# Patient Record
Sex: FEMALE | Race: ASIAN | NOT HISPANIC OR LATINO
[De-identification: names, ages, dates, MRNs, and addresses within clinical notes are randomized per-mention and may not be internally consistent; named-entity substitution may affect disease eponyms.]

---

## 2018-01-22 ENCOUNTER — RESULT REVIEW (OUTPATIENT)
Age: 28
End: 2018-01-22

## 2018-03-27 ENCOUNTER — RESULT REVIEW (OUTPATIENT)
Age: 28
End: 2018-03-27

## 2018-05-23 ENCOUNTER — INPATIENT (INPATIENT)
Facility: HOSPITAL | Age: 28
LOS: 2 days | Discharge: HOME | DRG: 759 | End: 2018-05-26
Attending: OBSTETRICS & GYNECOLOGY | Admitting: OBSTETRICS & GYNECOLOGY
Payer: COMMERCIAL

## 2018-05-23 VITALS
RESPIRATION RATE: 18 BRPM | TEMPERATURE: 100 F | WEIGHT: 160.06 LBS | HEART RATE: 146 BPM | SYSTOLIC BLOOD PRESSURE: 127 MMHG | OXYGEN SATURATION: 100 % | DIASTOLIC BLOOD PRESSURE: 81 MMHG

## 2018-05-23 LAB
ALBUMIN SERPL ELPH-MCNC: 4.6 G/DL — SIGNIFICANT CHANGE UP (ref 3.3–5)
ALP SERPL-CCNC: 88 U/L — SIGNIFICANT CHANGE UP (ref 40–120)
ALT FLD-CCNC: 30 U/L — SIGNIFICANT CHANGE UP (ref 10–45)
ANION GAP SERPL CALC-SCNC: 15 MMOL/L — SIGNIFICANT CHANGE UP (ref 5–17)
APPEARANCE UR: CLEAR — SIGNIFICANT CHANGE UP
APTT BLD: 31.8 SEC — SIGNIFICANT CHANGE UP (ref 27.5–37.4)
AST SERPL-CCNC: 14 U/L — SIGNIFICANT CHANGE UP (ref 10–40)
BASE EXCESS BLDV CALC-SCNC: 2.3 MMOL/L — HIGH (ref -2–2)
BASOPHILS # BLD AUTO: 0.1 K/UL — SIGNIFICANT CHANGE UP (ref 0–0.2)
BASOPHILS NFR BLD AUTO: 0.5 % — SIGNIFICANT CHANGE UP (ref 0–2)
BILIRUB SERPL-MCNC: 0.4 MG/DL — SIGNIFICANT CHANGE UP (ref 0.2–1.2)
BILIRUB UR-MCNC: NEGATIVE — SIGNIFICANT CHANGE UP
BUN SERPL-MCNC: 8 MG/DL — SIGNIFICANT CHANGE UP (ref 7–23)
CA-I SERPL-SCNC: 1.11 MMOL/L — LOW (ref 1.12–1.3)
CALCIUM SERPL-MCNC: 9.3 MG/DL — SIGNIFICANT CHANGE UP (ref 8.4–10.5)
CHLORIDE BLDV-SCNC: 108 MMOL/L — SIGNIFICANT CHANGE UP (ref 96–108)
CHLORIDE SERPL-SCNC: 97 MMOL/L — SIGNIFICANT CHANGE UP (ref 96–108)
CO2 BLDV-SCNC: 28 MMOL/L — SIGNIFICANT CHANGE UP (ref 22–30)
CO2 SERPL-SCNC: 25 MMOL/L — SIGNIFICANT CHANGE UP (ref 22–31)
COLOR SPEC: SIGNIFICANT CHANGE UP
CREAT SERPL-MCNC: 0.68 MG/DL — SIGNIFICANT CHANGE UP (ref 0.5–1.3)
DIFF PNL FLD: NEGATIVE — SIGNIFICANT CHANGE UP
EOSINOPHIL # BLD AUTO: 0 K/UL — SIGNIFICANT CHANGE UP (ref 0–0.5)
EOSINOPHIL NFR BLD AUTO: 0.3 % — SIGNIFICANT CHANGE UP (ref 0–6)
EPI CELLS # UR: SIGNIFICANT CHANGE UP /HPF
GAS PNL BLDV: 133 MMOL/L — LOW (ref 136–145)
GAS PNL BLDV: SIGNIFICANT CHANGE UP
GAS PNL BLDV: SIGNIFICANT CHANGE UP
GLUCOSE BLDV-MCNC: 153 MG/DL — HIGH (ref 70–99)
GLUCOSE SERPL-MCNC: 140 MG/DL — HIGH (ref 70–99)
GLUCOSE UR QL: >1000 MG/DL
HCO3 BLDV-SCNC: 27 MMOL/L — SIGNIFICANT CHANGE UP (ref 21–29)
HCT VFR BLD CALC: 41.2 % — SIGNIFICANT CHANGE UP (ref 34.5–45)
HCT VFR BLDA CALC: 42 % — SIGNIFICANT CHANGE UP (ref 39–50)
HGB BLD CALC-MCNC: 13.5 G/DL — SIGNIFICANT CHANGE UP (ref 11.5–15.5)
HGB BLD-MCNC: 13.8 G/DL — SIGNIFICANT CHANGE UP (ref 11.5–15.5)
INR BLD: 1.13 RATIO — SIGNIFICANT CHANGE UP (ref 0.88–1.16)
KETONES UR-MCNC: ABNORMAL
LACTATE BLDV-MCNC: 1.9 MMOL/L — SIGNIFICANT CHANGE UP (ref 0.7–2)
LEUKOCYTE ESTERASE UR-ACNC: NEGATIVE — SIGNIFICANT CHANGE UP
LYMPHOCYTES # BLD AUTO: 2.9 K/UL — SIGNIFICANT CHANGE UP (ref 1–3.3)
LYMPHOCYTES # BLD AUTO: 21.2 % — SIGNIFICANT CHANGE UP (ref 13–44)
MCHC RBC-ENTMCNC: 27.6 PG — SIGNIFICANT CHANGE UP (ref 27–34)
MCHC RBC-ENTMCNC: 33.4 GM/DL — SIGNIFICANT CHANGE UP (ref 32–36)
MCV RBC AUTO: 82.6 FL — SIGNIFICANT CHANGE UP (ref 80–100)
MONOCYTES # BLD AUTO: 0.5 K/UL — SIGNIFICANT CHANGE UP (ref 0–0.9)
MONOCYTES NFR BLD AUTO: 3.4 % — SIGNIFICANT CHANGE UP (ref 2–14)
NEUTROPHILS # BLD AUTO: 10.1 K/UL — HIGH (ref 1.8–7.4)
NEUTROPHILS NFR BLD AUTO: 74.6 % — SIGNIFICANT CHANGE UP (ref 43–77)
NITRITE UR-MCNC: NEGATIVE — SIGNIFICANT CHANGE UP
PCO2 BLDV: 44 MMHG — SIGNIFICANT CHANGE UP (ref 35–50)
PH BLDV: 7.4 — SIGNIFICANT CHANGE UP (ref 7.35–7.45)
PH UR: 6.5 — SIGNIFICANT CHANGE UP (ref 5–8)
PLATELET # BLD AUTO: 331 K/UL — SIGNIFICANT CHANGE UP (ref 150–400)
PO2 BLDV: 32 MMHG — SIGNIFICANT CHANGE UP (ref 25–45)
POTASSIUM BLDV-SCNC: 3.7 MMOL/L — SIGNIFICANT CHANGE UP (ref 3.5–5)
POTASSIUM SERPL-MCNC: 3.8 MMOL/L — SIGNIFICANT CHANGE UP (ref 3.5–5.3)
POTASSIUM SERPL-SCNC: 3.8 MMOL/L — SIGNIFICANT CHANGE UP (ref 3.5–5.3)
PROT SERPL-MCNC: 8 G/DL — SIGNIFICANT CHANGE UP (ref 6–8.3)
PROT UR-MCNC: NEGATIVE — SIGNIFICANT CHANGE UP
PROTHROM AB SERPL-ACNC: 12.2 SEC — SIGNIFICANT CHANGE UP (ref 9.8–12.7)
RBC # BLD: 4.99 M/UL — SIGNIFICANT CHANGE UP (ref 3.8–5.2)
RBC # FLD: 13 % — SIGNIFICANT CHANGE UP (ref 10.3–14.5)
RBC CASTS # UR COMP ASSIST: SIGNIFICANT CHANGE UP /HPF (ref 0–2)
SAO2 % BLDV: 57 % — LOW (ref 67–88)
SODIUM SERPL-SCNC: 137 MMOL/L — SIGNIFICANT CHANGE UP (ref 135–145)
SP GR SPEC: 1.02 — SIGNIFICANT CHANGE UP (ref 1.01–1.02)
UROBILINOGEN FLD QL: NEGATIVE — SIGNIFICANT CHANGE UP
WBC # BLD: 13.5 K/UL — HIGH (ref 3.8–10.5)
WBC # FLD AUTO: 13.5 K/UL — HIGH (ref 3.8–10.5)
WBC UR QL: SIGNIFICANT CHANGE UP /HPF (ref 0–5)

## 2018-05-23 PROCEDURE — 74177 CT ABD & PELVIS W/CONTRAST: CPT | Mod: 26

## 2018-05-23 PROCEDURE — 99285 EMERGENCY DEPT VISIT HI MDM: CPT

## 2018-05-23 RX ORDER — ACETAMINOPHEN 500 MG
1000 TABLET ORAL ONCE
Qty: 0 | Refills: 0 | Status: COMPLETED | OUTPATIENT
Start: 2018-05-23 | End: 2018-05-23

## 2018-05-23 RX ORDER — PIPERACILLIN AND TAZOBACTAM 4; .5 G/20ML; G/20ML
3.38 INJECTION, POWDER, LYOPHILIZED, FOR SOLUTION INTRAVENOUS ONCE
Qty: 0 | Refills: 0 | Status: COMPLETED | OUTPATIENT
Start: 2018-05-23 | End: 2018-05-23

## 2018-05-23 RX ORDER — SODIUM CHLORIDE 9 MG/ML
2000 INJECTION INTRAMUSCULAR; INTRAVENOUS; SUBCUTANEOUS ONCE
Qty: 0 | Refills: 0 | Status: COMPLETED | OUTPATIENT
Start: 2018-05-23 | End: 2018-05-23

## 2018-05-23 RX ADMIN — Medication 400 MILLIGRAM(S): at 21:18

## 2018-05-23 RX ADMIN — PIPERACILLIN AND TAZOBACTAM 200 GRAM(S): 4; .5 INJECTION, POWDER, LYOPHILIZED, FOR SOLUTION INTRAVENOUS at 22:03

## 2018-05-23 RX ADMIN — SODIUM CHLORIDE 2000 MILLILITER(S): 9 INJECTION INTRAMUSCULAR; INTRAVENOUS; SUBCUTANEOUS at 20:47

## 2018-05-23 NOTE — ED PROVIDER NOTE - OBJECTIVE STATEMENT
26 y/o female no p mhx who presents to the ED for abdominal pain. reports 2 days of sharp stabbing intermittent pain. no n/v/d. no fever/chills. no vaginal discharge/dysuria. no surgical hx. lmp 2 weeks ago - denies chance of pregnancy.

## 2018-05-23 NOTE — ED ADULT NURSE NOTE - CHPI ED SYMPTOMS NEG
no vomiting/no hematuria/no dysuria/no blood in stool/no nausea/no burning urination/no chills/no abdominal distension/no fever

## 2018-05-23 NOTE — ED PROVIDER NOTE - ATTENDING CONTRIBUTION TO CARE
26 yo F presents with abdominal pain since yesterday. generalized nonspecific pain that is more in the lower abdomen at this time. constant 6/10 discomfort. no associated symptoms.   PE with generalized abdominal TTP, but most tender RLQ with localized peritonitis. no adnexal TTP  patient started on IVFs and given zosyn empirically in the ER. 26 yo F presents with abdominal pain since yesterday. generalized nonspecific pain that is more in the lower abdomen at this time. constant 6/10 discomfort. no associated symptoms.  Denies fevers/chills, urinary complaints, vaginal discharge or bleeding. No known STD risk factors  Or history.  PE with generalized abdominal TTP, but most tender RLQ with localized peritonitis. no significant adnexal TTP. no CMT, no discharge  patient started on IVFs and given zosyn empirically in the ER.  ed wokrup showed  CT a/p showing Enlarged bilateral adnexal cystic structures the right larger than left, 	may represent cysts, however some areas demonstrate mildly tubular 	configuration for which hydrosalpinx cannot be excluded. Normal appendix.   Leukocytosis of 13.5.  Lactate normal.  Ultrasound pelvis ordered. OB/GYN consulted, who is will come evaluate patient in the ER.   Patient signed out to Dr. Gutierrez at this time. Ultrasound results as well as consult and final disposition pending at time of Sino. 26 yo F presents with abdominal pain since yesterday. generalized nonspecific pain that is more in the lower abdomen at this time. constant 6/10 discomfort. no associated symptoms.  Denies fevers/chills, urinary complaints, vaginal discharge or bleeding. No known STD risk factors  Or history.  PE with generalized abdominal TTP, but most tender RLQ with localized peritonitis. no significant adnexal TTP. no CMT, no discharge  patient started on IVFs and given zosyn empirically in the ER.  ed wokrup showed  CT a/p showing Enlarged bilateral adnexal cystic structures the right larger than left, 	may represent cysts, however some areas demonstrate mildly tubular 	configuration for which hydrosalpinx cannot be excluded. Normal appendix.   Leukocytosis of 13.5.  Lactate normal.  Ultrasound pelvis ordered. OB/GYN consulted, who is will come evaluate patient in the ER.   Patient signed out to Dr. Gutierrez at this time. Ultrasound results as well as consult and final disposition pending at time of Sign out.

## 2018-05-23 NOTE — ED ADULT NURSE NOTE - OBJECTIVE STATEMENT
27 year old female came into the ER via walk in with c/o generalized abdominal pain. Pt states the pain was sudden onset this evening as she started eating dinner. Pain is generalized, but worse in the RLQ and tender. Abd is soft, non distended, and pt states her BMs have been more liquid and less stool than normal x 1 day. Pt has no c/o back pain, CP, SOB, N/V/D, fever, chills, HA, dizziness at this time. Family at bedside. Comfort and safety maintained.

## 2018-05-24 ENCOUNTER — TRANSCRIPTION ENCOUNTER (OUTPATIENT)
Age: 28
End: 2018-05-24

## 2018-05-24 DIAGNOSIS — N70.11 CHRONIC SALPINGITIS: ICD-10-CM

## 2018-05-24 LAB
BASOPHILS # BLD AUTO: 0 K/UL — SIGNIFICANT CHANGE UP (ref 0–0.2)
BASOPHILS NFR BLD AUTO: 0.5 % — SIGNIFICANT CHANGE UP (ref 0–2)
C TRACH RRNA SPEC QL NAA+PROBE: SIGNIFICANT CHANGE UP
CULTURE RESULTS: SIGNIFICANT CHANGE UP
EOSINOPHIL # BLD AUTO: 0.1 K/UL — SIGNIFICANT CHANGE UP (ref 0–0.5)
EOSINOPHIL NFR BLD AUTO: 0.9 % — SIGNIFICANT CHANGE UP (ref 0–6)
GLUCOSE BLDC GLUCOMTR-MCNC: 145 MG/DL — HIGH (ref 70–99)
GLUCOSE BLDC GLUCOMTR-MCNC: 148 MG/DL — HIGH (ref 70–99)
GLUCOSE BLDC GLUCOMTR-MCNC: 187 MG/DL — HIGH (ref 70–99)
GLUCOSE BLDC GLUCOMTR-MCNC: 208 MG/DL — HIGH (ref 70–99)
HCT VFR BLD CALC: 31.5 % — LOW (ref 34.5–45)
HGB BLD-MCNC: 10.5 G/DL — LOW (ref 11.5–15.5)
HIV 1+2 AB+HIV1 P24 AG SERPL QL IA: SIGNIFICANT CHANGE UP
LYMPHOCYTES # BLD AUTO: 1.4 K/UL — SIGNIFICANT CHANGE UP (ref 1–3.3)
LYMPHOCYTES # BLD AUTO: 19.3 % — SIGNIFICANT CHANGE UP (ref 13–44)
MCHC RBC-ENTMCNC: 27.9 PG — SIGNIFICANT CHANGE UP (ref 27–34)
MCHC RBC-ENTMCNC: 33.3 GM/DL — SIGNIFICANT CHANGE UP (ref 32–36)
MCV RBC AUTO: 83.7 FL — SIGNIFICANT CHANGE UP (ref 80–100)
MONOCYTES # BLD AUTO: 0.3 K/UL — SIGNIFICANT CHANGE UP (ref 0–0.9)
MONOCYTES NFR BLD AUTO: 4.4 % — SIGNIFICANT CHANGE UP (ref 2–14)
N GONORRHOEA RRNA SPEC QL NAA+PROBE: SIGNIFICANT CHANGE UP
NEUTROPHILS # BLD AUTO: 5.3 K/UL — SIGNIFICANT CHANGE UP (ref 1.8–7.4)
NEUTROPHILS NFR BLD AUTO: 74.9 % — SIGNIFICANT CHANGE UP (ref 43–77)
PLATELET # BLD AUTO: 228 K/UL — SIGNIFICANT CHANGE UP (ref 150–400)
RBC # BLD: 3.76 M/UL — LOW (ref 3.8–5.2)
RBC # FLD: 13 % — SIGNIFICANT CHANGE UP (ref 10.3–14.5)
SPECIMEN SOURCE: SIGNIFICANT CHANGE UP
SPECIMEN SOURCE: SIGNIFICANT CHANGE UP
T PALLIDUM AB TITR SER: NEGATIVE — SIGNIFICANT CHANGE UP
WBC # BLD: 7.1 K/UL — SIGNIFICANT CHANGE UP (ref 3.8–10.5)
WBC # FLD AUTO: 7.1 K/UL — SIGNIFICANT CHANGE UP (ref 3.8–10.5)

## 2018-05-24 PROCEDURE — 93976 VASCULAR STUDY: CPT | Mod: 26,59

## 2018-05-24 PROCEDURE — 76856 US EXAM PELVIC COMPLETE: CPT | Mod: 26,59

## 2018-05-24 PROCEDURE — 76830 TRANSVAGINAL US NON-OB: CPT | Mod: 26

## 2018-05-24 RX ORDER — ACETAMINOPHEN 500 MG
975 TABLET ORAL EVERY 6 HOURS
Qty: 0 | Refills: 0 | Status: DISCONTINUED | OUTPATIENT
Start: 2018-05-24 | End: 2018-05-26

## 2018-05-24 RX ORDER — SODIUM CHLORIDE 9 MG/ML
1000 INJECTION, SOLUTION INTRAVENOUS
Qty: 0 | Refills: 0 | Status: DISCONTINUED | OUTPATIENT
Start: 2018-05-24 | End: 2018-05-25

## 2018-05-24 RX ORDER — CEFOTETAN DISODIUM 1 G
VIAL (EA) INJECTION
Qty: 0 | Refills: 0 | Status: DISCONTINUED | OUTPATIENT
Start: 2018-05-24 | End: 2018-05-26

## 2018-05-24 RX ORDER — HEPARIN SODIUM 5000 [USP'U]/ML
5000 INJECTION INTRAVENOUS; SUBCUTANEOUS EVERY 12 HOURS
Qty: 0 | Refills: 0 | Status: DISCONTINUED | OUTPATIENT
Start: 2018-05-24 | End: 2018-05-26

## 2018-05-24 RX ORDER — DEXTROSE 50 % IN WATER 50 %
12.5 SYRINGE (ML) INTRAVENOUS ONCE
Qty: 0 | Refills: 0 | Status: DISCONTINUED | OUTPATIENT
Start: 2018-05-24 | End: 2018-05-26

## 2018-05-24 RX ORDER — GLUCAGON INJECTION, SOLUTION 0.5 MG/.1ML
1 INJECTION, SOLUTION SUBCUTANEOUS ONCE
Qty: 0 | Refills: 0 | Status: DISCONTINUED | OUTPATIENT
Start: 2018-05-24 | End: 2018-05-26

## 2018-05-24 RX ORDER — INSULIN LISPRO 100/ML
VIAL (ML) SUBCUTANEOUS AT BEDTIME
Qty: 0 | Refills: 0 | Status: DISCONTINUED | OUTPATIENT
Start: 2018-05-24 | End: 2018-05-26

## 2018-05-24 RX ORDER — CEFOTETAN DISODIUM 1 G
2 VIAL (EA) INJECTION EVERY 12 HOURS
Qty: 0 | Refills: 0 | Status: DISCONTINUED | OUTPATIENT
Start: 2018-05-24 | End: 2018-05-26

## 2018-05-24 RX ORDER — DEXTROSE 50 % IN WATER 50 %
25 SYRINGE (ML) INTRAVENOUS ONCE
Qty: 0 | Refills: 0 | Status: DISCONTINUED | OUTPATIENT
Start: 2018-05-24 | End: 2018-05-26

## 2018-05-24 RX ORDER — METRONIDAZOLE 500 MG
TABLET ORAL
Qty: 0 | Refills: 0 | Status: DISCONTINUED | OUTPATIENT
Start: 2018-05-24 | End: 2018-05-26

## 2018-05-24 RX ORDER — METRONIDAZOLE 500 MG
500 TABLET ORAL ONCE
Qty: 0 | Refills: 0 | Status: COMPLETED | OUTPATIENT
Start: 2018-05-24 | End: 2018-05-24

## 2018-05-24 RX ORDER — DEXTROSE 50 % IN WATER 50 %
15 SYRINGE (ML) INTRAVENOUS ONCE
Qty: 0 | Refills: 0 | Status: DISCONTINUED | OUTPATIENT
Start: 2018-05-24 | End: 2018-05-26

## 2018-05-24 RX ORDER — INSULIN LISPRO 100/ML
VIAL (ML) SUBCUTANEOUS
Qty: 0 | Refills: 0 | Status: DISCONTINUED | OUTPATIENT
Start: 2018-05-24 | End: 2018-05-26

## 2018-05-24 RX ORDER — METRONIDAZOLE 500 MG
500 TABLET ORAL EVERY 8 HOURS
Qty: 0 | Refills: 0 | Status: DISCONTINUED | OUTPATIENT
Start: 2018-05-24 | End: 2018-05-26

## 2018-05-24 RX ORDER — CEFOTETAN DISODIUM 1 G
2 VIAL (EA) INJECTION ONCE
Qty: 0 | Refills: 0 | Status: COMPLETED | OUTPATIENT
Start: 2018-05-24 | End: 2018-05-24

## 2018-05-24 RX ADMIN — Medication 100 MILLIGRAM(S): at 12:15

## 2018-05-24 RX ADMIN — Medication 975 MILLIGRAM(S): at 20:17

## 2018-05-24 RX ADMIN — Medication 100 GRAM(S): at 05:02

## 2018-05-24 RX ADMIN — Medication 110 MILLIGRAM(S): at 06:19

## 2018-05-24 RX ADMIN — Medication 110 MILLIGRAM(S): at 19:47

## 2018-05-24 RX ADMIN — Medication 975 MILLIGRAM(S): at 19:47

## 2018-05-24 RX ADMIN — Medication 100 GRAM(S): at 19:05

## 2018-05-24 RX ADMIN — Medication 4: at 20:03

## 2018-05-24 RX ADMIN — SODIUM CHLORIDE 125 MILLILITER(S): 9 INJECTION, SOLUTION INTRAVENOUS at 12:15

## 2018-05-24 RX ADMIN — Medication 100 MILLIGRAM(S): at 22:59

## 2018-05-24 RX ADMIN — Medication 100 MILLIGRAM(S): at 03:51

## 2018-05-24 NOTE — DISCHARGE NOTE ADULT - HOSPITAL COURSE
26 yo F with a PMH of T2DM, initially presented with 2 days of abdominal pain and leukocytosis, admitted with suspected b/l TOA on 5/24/18. Imaging CTAP and TVUS showed b/l complex cystic structures (R>L) in the adnexa, largest being 4.5x2.9x2.9cm. Pt was hemodynamically stable and afebrile throughout her hospital course.   HD#1: Pt started on IV abx (cefotetan, doxycycline, flagyl). PO pain medication. ISS for DM.  Cultures and labs obtained. Pt remained afebrile.  HD#2: Leukocytosis resolved and pain well controlled. Bcx preliminarily showed NGTD and all other labs negative  HD#3: Pt for repeat TVUS that revealed ***  HD#**: Pt discharge home pt was discharged in stable condition, ambulating, tolerating po and voiding spontaneously. Pt to have close f/u w/ Dr. Fleming in clinic. 28 yo F with a PMH of T2DM, initially presented with 2 days of abdominal pain and leukocytosis, admitted with suspected b/l TOA on 5/24/18. Imaging CTAP and TVUS showed b/l complex cystic structures (R>L) in the adnexa, largest being 4.5x2.9x2.9cm. Pt was hemodynamically stable and afebrile throughout her hospital course.   HD#1: Pt started on IV abx (cefotetan, doxycycline, flagyl). PO pain medication. ISS for DM.  Cultures and labs obtained. Pt remained afebrile.  HD#2: Leukocytosis resolved and pain well controlled. Bcx preliminarily showed NGTD and all other labs negative  HD#3: Pt for repeat TVUS that revealed stable findings, no worsening.  Pt discharged home on hd#3 in stable condition, ambulating, tolerating po and voiding spontaneously. Pt to have close f/u w/ Dr. Fleming in clinic.

## 2018-05-24 NOTE — H&P ADULT - NSHPPHYSICALEXAM_GEN_ALL_CORE
Constitutional: alert and oriented x 3  Respiratory: clear    Cardiovascular: regular rate and rhythm    Gastrointestinal: soft, non tender, + bowel sounds. No hepatosplenomegaly, no palpable masses. No rebound. No guarding  Cervix: closed/ long, no CMT, no discharge  Uterus: normal size, non tender  Adnexa: non tender, no palpable masses Constitutional: alert and oriented x 3  Respiratory: clear    Cardiovascular: regular rate and rhythm    Gastrointestinal: soft, non tender, + bowel sounds. No hepatosplenomegaly, no palpable masses. No rebound. No guarding  Cervix: closed/ long, no CMT, no discharge  Uterus: normal size, non tender  Adnexa: mild suprapubic tenderness, no palpable masses

## 2018-05-24 NOTE — H&P ADULT - HISTORY OF PRESENT ILLNESS
27y P0 LMP 2 weeks ago presents c/o suprapubic abdominal pain x 2 days, comes and goes, sharp, no associated symptoms.  Denies N/V/D/dysuria/vaginal discharge/fevers/chills      OB/GYN HISTORY: Painful periods, regular q 30 days, sexually active w/1 partner  PAST MEDICAL & SURGICAL HISTORY:  T2 DM    Allergies    No Known Allergies    Intolerances      MEDICATIONS  (STANDING):  Metformin 27y P0 LMP 2 weeks ago presents c/o suprapubic abdominal pain x 2 days, comes and goes, sharp, no associated symptoms.  Denies N/V/D/dysuria/vaginal discharge/fevers/chills      OB/GYN HISTORY: Painful periods, regular q 30 days, sexually active w/1 partner, uses condoms, does not use birth control  PAST MEDICAL & SURGICAL HISTORY:  T2 DM    Allergies    No Known Allergies    Intolerances      MEDICATIONS  (STANDING):  Metformin

## 2018-05-24 NOTE — DISCHARGE NOTE ADULT - MEDICATION SUMMARY - MEDICATIONS TO TAKE
I will START or STAY ON the medications listed below when I get home from the hospital:    acetaminophen 325 mg oral tablet  -- 3 tab(s) by mouth every 6 hours, As needed, Moderate Pain (4 - 6)  -- Indication: For pain as needed    Augmentin XR 1000 mg-62.5 mg oral tablet, extended release  -- 2000 milligram(s) by mouth every 12 hours   -- Finish all this medication unless otherwise directed by prescriber.  Take with food or milk.    -- Indication: For presumed TOA

## 2018-05-24 NOTE — H&P ADULT - ASSESSMENT
28yo P0 LMP 2 weeks ago p/w abdominal pain x 2 days, tachycardic in ED w/ Imaging demonstrating bilateral adnexal structures concerning for PID.    -Admit to GYN  -Cefotetan, Doxy, Flagyl  -Bcx, Ucx, UA  -AM CBC  -LR@125  -Reg Arvin Guerrero, PGY2  D/W Dr. Fleming  #[id number]

## 2018-05-24 NOTE — CONSULT NOTE ADULT - SUBJECTIVE AND OBJECTIVE BOX
27y P0 LMP 2 weeks ago presents c/o suprapubic abdominal pain x 2 days, comes and goes, sharp, no associated symptoms.  Denies N/V/D/dysuria/vaginal discharge.      OB/GYN HISTORY: Painful periods, regular q 30 days, sexually active w/1 partner  PAST MEDICAL & SURGICAL HISTORY:  T2 DM    Allergies    No Known Allergies    Intolerances      MEDICATIONS  (STANDING):   Metformin    Vital Signs Last 24 Hrs  T(C): 36.7 (23 May 2018 23:59), Max: 37.6 (23 May 2018 18:48)  T(F): 98 (23 May 2018 23:59), Max: 99.7 (23 May 2018 18:48)  HR: 102 (23 May 2018 23:59) (102 - 146)  BP: 102/70 (23 May 2018 23:59) (102/70 - 127/81)  BP(mean): --  RR: 18 (23 May 2018 23:59) (18 - 18)  SpO2: 98% (23 May 2018 23:59) (98% - 100%)    PHYSICAL EXAM:      Constitutional: alert and oriented x 3  Respiratory: clear    Cardiovascular: regular rate and rhythm    Gastrointestinal: soft, non tender, + bowel sounds. No hepatosplenomegaly, no palpable masses. No rebound. No guarding  Cervix: closed/ long, no CMT, no discharge  Uterus: normal size, non tender  Adnexa: non tender, no palpable masses        LABS:                        13.8   13.5  )-----------( 331      ( 23 May 2018 20:53 )             41.2     05    137  |  97  |  8   ----------------------------<  140<H>  3.8   |  25  |  0.68    Ca    9.3      23 May 2018 20:53    TPro  8.0  /  Alb  4.6  /  TBili  0.4  /  DBili  x   /  AST  14  /  ALT  30  /  AlkPhos  88      PT/INR - ( 23 May 2018 20:53 )   PT: 12.2 sec;   INR: 1.13 ratio         PTT - ( 23 May 2018 20:53 )  PTT:31.8 sec  Urinalysis Basic - ( 23 May 2018 21:36 )    Color: PL Yellow / Appearance: Clear / S.025 / pH: x  Gluc: x / Ketone: Small  / Bili: Negative / Urobili: Negative   Blood: x / Protein: Negative / Nitrite: Negative   Leuk Esterase: Negative / RBC: 0-2 /HPF / WBC 0-2 /HPF   Sq Epi: x / Non Sq Epi: OCC /HPF / Bacteria: x            RADIOLOGY & ADDITIONAL STUDIES:  < from: CT Abdomen and Pelvis w/ Oral Cont and w/ IV Cont (18 @ 23:07) >  REPRODUCTIVE ORGANS: Bilateral cystic adnexal structures somewhat which   demonstrate a mildly tubular configuration, right larger than left.   Largest right component measures 4.5 x 2.9 x 2.9 cm. There is mild   infiltration of the fat surrounding the adnexa. Mildly thickened   endometrium, which may correlate with patient's phase of menstruation.  BOWEL: No bowel obstruction. Appendix is normal.  PERITONEUM: No ascites.  VESSELS:  Within normal limits.  RETROPERITONEUM: Few small volume retroperitoneal nodes, nonspecific.    ABDOMINAL WALL: Within normal limits.  BONES: Within normal limits.    IMPRESSION:     Enlarged bilateral adnexal cystic structures the right larger than left,   may represent cysts, however some areas demonstrate mildly tubular   configuration for which hydrosalpinx cannot be excluded. Further   evaluation with pelvic ultrasound is recommended.    Normal appendix.      < end of copied text >

## 2018-05-24 NOTE — H&P ADULT - NSHPLABSRESULTS_GEN_ALL_CORE
Vital Signs Last 24 Hrs  T(C): 36.7 (23 May 2018 23:59), Max: 37.6 (23 May 2018 18:48)  T(F): 98 (23 May 2018 23:59), Max: 99.7 (23 May 2018 18:48)  HR: 102 (23 May 2018 23:59) (102 - 146)  BP: 102/70 (23 May 2018 23:59) (102/70 - 127/81)  BP(mean): --  RR: 18 (23 May 2018 23:59) (18 - 18)  SpO2: 98% (23 May 2018 23:59) (98% - 100%)    I&O's Detail                            13.8   13.5  )-----------( 331      ( 23 May 2018 20:53 )             41.2       05-    137  |  97  |  8   ----------------------------<  140<H>  3.8   |  25  |  0.68    Ca    9.3      23 May 2018 20:53    TPro  8.0  /  Alb  4.6  /  TBili  0.4  /  DBili  x   /  AST  14  /  ALT  30  /  AlkPhos  88  05-      CAPILLARY BLOOD GLUCOSE          LIVER FUNCTIONS - ( 23 May 2018 20:53 )  Alb: 4.6 g/dL / Pro: 8.0 g/dL / ALK PHOS: 88 U/L / ALT: 30 U/L / AST: 14 U/L / GGT: x             PT/INR - ( 23 May 2018 20:53 )   PT: 12.2 sec;   INR: 1.13 ratio         PTT - ( 23 May 2018 20:53 )  PTT:31.8 sec    Urinalysis Basic - ( 23 May 2018 21:36 )    Color: PL Yellow / Appearance: Clear / S.025 / pH: x  Gluc: x / Ketone: Small  / Bili: Negative / Urobili: Negative   Blood: x / Protein: Negative / Nitrite: Negative   Leuk Esterase: Negative / RBC: 0-2 /HPF / WBC 0-2 /HPF   Sq Epi: x / Non Sq Epi: OCC /HPF / Bacteria: x    REPRODUCTIVE ORGANS: Bilateral cystic adnexal structures somewhat which   demonstrate a mildly tubular configuration, right larger than left.   Largest right component measures 4.5 x 2.9 x 2.9 cm. There is mild   infiltration of the fat surrounding the adnexa. Mildly thickened   endometrium, which may correlate with patient's phase of menstruation.  BOWEL: No bowel obstruction. Appendix is normal.  PERITONEUM: No ascites.  VESSELS:  Within normal limits.  RETROPERITONEUM: Few small volume retroperitoneal nodes, nonspecific.    ABDOMINAL WALL: Within normal limits.  BONES: Within normal limits.    IMPRESSION:     Enlarged bilateral adnexal cystic structures the right larger than left,   may represent cysts, however some areas demonstrate mildly tubular   configuration for which hydrosalpinx cannot be excluded. Further   evaluation with pelvic ultrasound is recommended.    Normal appendix.

## 2018-05-24 NOTE — DISCHARGE NOTE ADULT - OTHER SIGNIFICANT FINDINGS
28 y/o F presents with 2 days of abdominal pain, admitted with presumed TOA.  EBL: ***. Hct:***   HD#1 pt was started in IV antibiotics. Remained afebrile hemodynamically stable.  HD#2, **  HD# pt was discharged in stable condition, ambulating, tolerating po and voiding spontaneously. Pt to have close f/u w/  [X] in clinic.

## 2018-05-24 NOTE — DISCHARGE NOTE ADULT - PLAN OF CARE
Recovery Expect abdominal cramping/pain the next few weeks until your infection resolves. Take ibuprofen and Tylenol for pain. Take the antibiotics as prescribed. Use a pad as needed. Call your physician or go to the emergency room if you experience any of the following: heavy vaginal bleeding (soaking more than 2 pads in 1 hour for 2 hours), fever, chills, nausea, vomiting, or pain that is not controlled by medication. Follow-up with Dr. Fleming in 2 weeks.

## 2018-05-24 NOTE — DISCHARGE NOTE ADULT - CARE PROVIDER_API CALL
Rishi Fleming), Obstetrics and Gynecology  [address] [place] [address]  Phone: [phone]  Fax: [phone]

## 2018-05-24 NOTE — PROGRESS NOTE ADULT - SUBJECTIVE AND OBJECTIVE BOX
INTERVAL HPI/OVERNIGHT EVENTS: Pt seen and examined at bedside.  Pt complains of improved abdominal pain since admission and overall feeling better.    MEDICATIONS  (STANDING):  cefoTEtan  IVPB      cefoTEtan  IVPB 2 Gram(s) IV Intermittent every 12 hours  doxycycline IVPB 100 milliGRAM(s) IV Intermittent every 12 hours  doxycycline IVPB      lactated ringers. 1000 milliLiter(s) (125 mL/Hr) IV Continuous <Continuous>  metroNIDAZOLE  IVPB      metroNIDAZOLE  IVPB 500 milliGRAM(s) IV Intermittent every 8 hours    MEDICATIONS  (PRN):  acetaminophen   Tablet. 975 milliGRAM(s) Oral every 6 hours PRN Moderate Pain (4 - 6)      Allergies    No Known Allergies    Intolerances        12 point ROS negative except as outlined above    Vital Signs Last 24 Hrs  T(C): 37.2 (24 May 2018 15:19), Max: 37.6 (23 May 2018 18:48)  T(F): 98.9 (24 May 2018 15:19), Max: 99.7 (23 May 2018 18:48)  HR: 108 (24 May 2018 15:19) (102 - 146)  BP: 106/74 (24 May 2018 15:19) (102/70 - 127/81)  BP(mean): --  RR: 14 (24 May 2018 15:19) (14 - 18)  SpO2: 98% (24 May 2018 15:19) (98% - 100%)    Last Menstrual Period      PHYSICAL EXAM:    GA: NAD, A+0 x 3  Abd: ( + ) BS, soft, nontender, nondistended, no rebound or guarding,     LABS:                        10.5   7.1   )-----------( 228      ( 24 May 2018 08:12 )             31.5         137  |  97  |  8   ----------------------------<  140<H>  3.8   |  25  |  0.68    Ca    9.3      23 May 2018 20:53    TPro  8.0  /  Alb  4.6  /  TBili  0.4  /  DBili  x   /  AST  14  /  ALT  30  /  AlkPhos  88  05-23    PT/INR - ( 23 May 2018 20:53 )   PT: 12.2 sec;   INR: 1.13 ratio         PTT - ( 23 May 2018 20:53 )  PTT:31.8 sec  Urinalysis Basic - ( 23 May 2018 21:36 )    Color: PL Yellow / Appearance: Clear / S.025 / pH: x  Gluc: x / Ketone: Small  / Bili: Negative / Urobili: Negative   Blood: x / Protein: Negative / Nitrite: Negative   Leuk Esterase: Negative / RBC: 0-2 /HPF / WBC 0-2 /HPF   Sq Epi: x / Non Sq Epi: OCC /HPF / Bacteria: x        RADIOLOGY & ADDITIONAL TESTS:    FINDINGS:      PELVIC ULTRASOUND     Uterus: 8.9 x 4.6 x 6.6 cm. Within normal limits.    Endometrium: Mild heterogeneous thickening measuring up to 16 mm.   Correlate with phase of menstruation.    Right ovary: 5.5 x 4.1 x 4.1 cm inclusive of a 4.6 x 2.5 x 3.4 cm complex   cystic structure, which appears somewhat irregular in shape. Ovarian   blood flow is demonstrated utilizing color and spectral Doppler.    Left ovary: 4.3 x 2.9 x 4.2 cm, demonstrating thick-walled cystic   structures, some of which demonstrate internal echoes and complexity.   Ovarian blood flow is demonstrated utilizing color and spectral Doppler.    Fluid: None.    IMPRESSION:    Bilateral complex appearing cystic structures, which appear to be   intraovarian common right larger than left. Given the irregularity   morphology of the cystic structures as well as hyperenhancing components   on CT and adjacent fat stranding on CT, tubo-ovarian abscesses cannot be   entirely excluded. Possibility that these structures are extra-ovarian,   however this is felt to be less likely as a claw sign is visualized,   suggesting intra-ovarian location.      Preserved bilateral ovarian vascularity.    Short-term repeat evaluation may be obtained as clinically warranted.

## 2018-05-24 NOTE — DISCHARGE NOTE ADULT - PATIENT PORTAL LINK FT
You can access the [facility] Patient Portal, offered by [facility], by registering with the following website: [email or web address]

## 2018-05-24 NOTE — PROGRESS NOTE ADULT - ASSESSMENT
28yo P0 admitted with low grade fever and pelvic pain, now found to have bilateral TOAs.    Patient clinically improved on current antibiotic regimen.    -continue inpatient management and IV antibiotics for 72 hours   -repeat ultrasound imaging on HD3  -if stable TOAs or improving, recommend outpatient management   -daily CBCs    Sharon Smith MD 28yo P0 admitted with low grade fever and pelvic pain, now found to have bilateral TOAs.    Patient clinically improved on current antibiotic regimen.    -continue inpatient management and IV antibiotics for 72 hours   -repeat ultrasound imaging on HD3  -if stable TOAs or improving, recommend outpatient management   -daily CBCs  -med lock IVF after current bag is done    -Plan of care reviewed with patient, all questions answered       Sharon Smith MD

## 2018-05-24 NOTE — DISCHARGE NOTE ADULT - CARE PLAN
Principal Discharge DX:	Hydrosalpinx  Goal:	Recovery  Assessment and plan of treatment:	Expect abdominal cramping/pain the next few weeks until your infection resolves. Take ibuprofen and Tylenol for pain. Take the antibiotics as prescribed. Use a pad as needed. Call your physician or go to the emergency room if you experience any of the following: heavy vaginal bleeding (soaking more than 2 pads in 1 hour for 2 hours), fever, chills, nausea, vomiting, or pain that is not controlled by medication. Follow-up with Dr. Fleming in 2 weeks.

## 2018-05-24 NOTE — ED ADULT NURSE REASSESSMENT NOTE - NS ED NURSE REASSESS COMMENT FT1
pt ambulating independently to restroom.
received report from Dagmar diggs rn in red. Patient found resting in bed with antibiotics infusion in progress, vital signs taken. pt denies abdominal/chest pain/fever/chills/n/v/d. states she has a mild headache, believes it is from being tired and in the hallway. Tylenol offered from prn order.   pt understands she is waiting on room assignment. pt appears well and in no acute distress, A&Ox4 with family at the bedside.

## 2018-05-25 LAB
BASOPHILS # BLD AUTO: 0 K/UL — SIGNIFICANT CHANGE UP (ref 0–0.2)
BASOPHILS NFR BLD AUTO: 0.5 % — SIGNIFICANT CHANGE UP (ref 0–2)
EOSINOPHIL # BLD AUTO: 0.1 K/UL — SIGNIFICANT CHANGE UP (ref 0–0.5)
EOSINOPHIL NFR BLD AUTO: 1.8 % — SIGNIFICANT CHANGE UP (ref 0–6)
GLUCOSE BLDC GLUCOMTR-MCNC: 136 MG/DL — HIGH (ref 70–99)
GLUCOSE BLDC GLUCOMTR-MCNC: 149 MG/DL — HIGH (ref 70–99)
GLUCOSE BLDC GLUCOMTR-MCNC: 160 MG/DL — HIGH (ref 70–99)
GLUCOSE BLDC GLUCOMTR-MCNC: 283 MG/DL — HIGH (ref 70–99)
HCT VFR BLD CALC: 39.6 % — SIGNIFICANT CHANGE UP (ref 34.5–45)
HGB BLD-MCNC: 11.9 G/DL — SIGNIFICANT CHANGE UP (ref 11.5–15.5)
LYMPHOCYTES # BLD AUTO: 2.2 K/UL — SIGNIFICANT CHANGE UP (ref 1–3.3)
LYMPHOCYTES # BLD AUTO: 32.7 % — SIGNIFICANT CHANGE UP (ref 13–44)
MCHC RBC-ENTMCNC: 25 PG — LOW (ref 27–34)
MCHC RBC-ENTMCNC: 30.1 GM/DL — LOW (ref 32–36)
MCV RBC AUTO: 83.3 FL — SIGNIFICANT CHANGE UP (ref 80–100)
MONOCYTES # BLD AUTO: 0.4 K/UL — SIGNIFICANT CHANGE UP (ref 0–0.9)
MONOCYTES NFR BLD AUTO: 6.4 % — SIGNIFICANT CHANGE UP (ref 2–14)
NEUTROPHILS # BLD AUTO: 3.9 K/UL — SIGNIFICANT CHANGE UP (ref 1.8–7.4)
NEUTROPHILS NFR BLD AUTO: 58.5 % — SIGNIFICANT CHANGE UP (ref 43–77)
PLATELET # BLD AUTO: 281 K/UL — SIGNIFICANT CHANGE UP (ref 150–400)
RBC # BLD: 4.76 M/UL — SIGNIFICANT CHANGE UP (ref 3.8–5.2)
RBC # FLD: 12.8 % — SIGNIFICANT CHANGE UP (ref 10.3–14.5)
WBC # BLD: 6.7 K/UL — SIGNIFICANT CHANGE UP (ref 3.8–10.5)
WBC # FLD AUTO: 6.7 K/UL — SIGNIFICANT CHANGE UP (ref 3.8–10.5)

## 2018-05-25 RX ORDER — SODIUM CHLORIDE 9 MG/ML
3 INJECTION INTRAMUSCULAR; INTRAVENOUS; SUBCUTANEOUS EVERY 8 HOURS
Qty: 0 | Refills: 0 | Status: DISCONTINUED | OUTPATIENT
Start: 2018-05-25 | End: 2018-05-26

## 2018-05-25 RX ADMIN — Medication 110 MILLIGRAM(S): at 05:33

## 2018-05-25 RX ADMIN — Medication 100 GRAM(S): at 05:33

## 2018-05-25 RX ADMIN — SODIUM CHLORIDE 3 MILLILITER(S): 9 INJECTION INTRAMUSCULAR; INTRAVENOUS; SUBCUTANEOUS at 22:46

## 2018-05-25 RX ADMIN — Medication 100 MILLIGRAM(S): at 23:46

## 2018-05-25 RX ADMIN — Medication 100 MILLIGRAM(S): at 16:52

## 2018-05-25 RX ADMIN — HEPARIN SODIUM 5000 UNIT(S): 5000 INJECTION INTRAVENOUS; SUBCUTANEOUS at 17:57

## 2018-05-25 RX ADMIN — HEPARIN SODIUM 5000 UNIT(S): 5000 INJECTION INTRAVENOUS; SUBCUTANEOUS at 05:34

## 2018-05-25 RX ADMIN — Medication 2: at 22:42

## 2018-05-25 RX ADMIN — Medication 100 MILLIGRAM(S): at 08:09

## 2018-05-25 RX ADMIN — Medication 100 GRAM(S): at 17:57

## 2018-05-25 RX ADMIN — Medication 2: at 20:30

## 2018-05-25 RX ADMIN — Medication 110 MILLIGRAM(S): at 18:54

## 2018-05-25 NOTE — PROGRESS NOTE ADULT - SUBJECTIVE AND OBJECTIVE BOX
R3 GYN Note:     Pt seen and examined at bedside. No acute events overnight. Pt states that she is feeling well. Pain is well controlled. Pt denies fever, chills, n/v, pelvic pain, abdominal pain, heavy bleeding, foul smelling discharge, dysuria, chest pain or SOB.      MEDICATIONS  (STANDING):  cefoTEtan  IVPB      cefoTEtan  IVPB 2 Gram(s) IV Intermittent every 12 hours  dextrose 5%. 1000 milliLiter(s) (50 mL/Hr) IV Continuous <Continuous>  dextrose 50% Injectable 12.5 Gram(s) IV Push once  dextrose 50% Injectable 25 Gram(s) IV Push once  dextrose 50% Injectable 25 Gram(s) IV Push once  doxycycline IVPB 100 milliGRAM(s) IV Intermittent every 12 hours  doxycycline IVPB      heparin  Injectable 5000 Unit(s) SubCutaneous every 12 hours  insulin lispro (HumaLOG) corrective regimen sliding scale   SubCutaneous three times a day before meals  insulin lispro (HumaLOG) corrective regimen sliding scale   SubCutaneous at bedtime  lactated ringers. 1000 milliLiter(s) (125 mL/Hr) IV Continuous <Continuous>  metroNIDAZOLE  IVPB      metroNIDAZOLE  IVPB 500 milliGRAM(s) IV Intermittent every 8 hours    MEDICATIONS  (PRN):  acetaminophen   Tablet. 975 milliGRAM(s) Oral every 6 hours PRN Moderate Pain (4 - 6)  dextrose 40% Gel 15 Gram(s) Oral once PRN Blood Glucose LESS THAN 70 milliGRAM(s)/deciliter  glucagon  Injectable 1 milliGRAM(s) IntraMuscular once PRN Glucose LESS THAN 70 milligrams/deciliter      Vital Signs Last 24 Hrs  T(C): 36.4 (25 May 2018 05:27), Max: 37.2 (24 May 2018 14:46)  T(F): 97.5 (25 May 2018 05:27), Max: 98.9 (24 May 2018 14:46)  HR: 74 (25 May 2018 05:27) (74 - 113)  BP: 100/64 (25 May 2018 05:27) (100/64 - 126/87)  BP(mean): --  RR: 17 (25 May 2018 05:27) (14 - 18)  SpO2: 98% (25 May 2018 05:27) (98% - 100%)    PHYSICAL EXAM:    GA: NAD, A+0 x 3  CV: RRR  Pulm: CTA BL  Abd: soft, nontender, nondistended, no rebound or guarding,   Extremities: no swelling or calf tenderness    LABS:                        11.9   6.7   )-----------( 281      ( 25 May 2018 06:18 )             39.6         137  |  97  |  8   ----------------------------<  140<H>  3.8   |  25  |  0.68    Ca    9.3      23 May 2018 20:53    TPro  8.0  /  Alb  4.6  /  TBili  0.4  /  DBili  x   /  AST  14  /  ALT  30  /  AlkPhos  88      PT/INR - ( 23 May 2018 20:53 )   PT: 12.2 sec;   INR: 1.13 ratio         PTT - ( 23 May 2018 20:53 )  PTT:31.8 sec  Urinalysis Basic - ( 23 May 2018 21:36 )    Color: PL Yellow / Appearance: Clear / S.025 / pH: x  Gluc: x / Ketone: Small  / Bili: Negative / Urobili: Negative   Blood: x / Protein: Negative / Nitrite: Negative   Leuk Esterase: Negative / RBC: 0-2 /HPF / WBC 0-2 /HPF   Sq Epi: x / Non Sq Epi: OCC /HPF / Bacteria: x        RADIOLOGY & ADDITIONAL TESTS:

## 2018-05-25 NOTE — PROGRESS NOTE ADULT - ASSESSMENT
26 yo a/w abdominal pain 2/2 to TOA. HD#2    Neuro: Pain well controlled with PO meds  CV: Hemodynamically stable, no hypotension or tachycardia  Pulm: O2 Sat wnl  GI: Reg diet  : Voiding  ID: Afebrile, AM CBC pending, continue treatment of TOA w/ IV Cefotetan, doxycycline and flagyl.   Heme: HSQ and ambulation for DVT ppx  Dispo: Start d/c planning for tomorrow it pt remains stable    MELISSA Whitlock, PGY3

## 2018-05-25 NOTE — PROGRESS NOTE ADULT - SUBJECTIVE AND OBJECTIVE BOX
OB Attending Note    S: Patient doing well. no pain or fever    O: Vital Signs Last 24 Hrs  T(C): 36.8 (25 May 2018 13:24), Max: 37.2 (24 May 2018 15:19)  T(F): 98.3 (25 May 2018 13:24), Max: 98.9 (24 May 2018 15:19)  HR: 105 (25 May 2018 13:24) (74 - 108)  BP: 111/80 (25 May 2018 13:24) (9/77 - 116/82)  BP(mean): --  RR: 16 (25 May 2018 13:24) (14 - 17)  SpO2: 98% (25 May 2018 13:24) (98% - 99%)    Gen: NAD  Abd: soft, NT, ND,    Labs:                        11.9   6.7   )-----------( 281      ( 25 May 2018 06:18 )             39.6       A: 27y HD #2 admitted for PID/bilateral TOAs  cont IV antibiotics  will get rpt sonogram tomorrow and then likely d/c in AM if remains stable  will d/c home with po antibiotics  improved WBC

## 2018-05-26 VITALS
RESPIRATION RATE: 17 BRPM | SYSTOLIC BLOOD PRESSURE: 103 MMHG | TEMPERATURE: 98 F | OXYGEN SATURATION: 99 % | HEART RATE: 98 BPM | DIASTOLIC BLOOD PRESSURE: 73 MMHG

## 2018-05-26 DIAGNOSIS — N70.11 CHRONIC SALPINGITIS: ICD-10-CM

## 2018-05-26 LAB
ANION GAP SERPL CALC-SCNC: 12 MMOL/L — SIGNIFICANT CHANGE UP (ref 5–17)
BASOPHILS # BLD AUTO: 0.1 K/UL — SIGNIFICANT CHANGE UP (ref 0–0.2)
BASOPHILS NFR BLD AUTO: 0.7 % — SIGNIFICANT CHANGE UP (ref 0–2)
BUN SERPL-MCNC: 8 MG/DL — SIGNIFICANT CHANGE UP (ref 7–23)
CALCIUM SERPL-MCNC: 10 MG/DL — SIGNIFICANT CHANGE UP (ref 8.4–10.5)
CHLORIDE SERPL-SCNC: 100 MMOL/L — SIGNIFICANT CHANGE UP (ref 96–108)
CO2 SERPL-SCNC: 26 MMOL/L — SIGNIFICANT CHANGE UP (ref 22–31)
CREAT SERPL-MCNC: 0.69 MG/DL — SIGNIFICANT CHANGE UP (ref 0.5–1.3)
EOSINOPHIL # BLD AUTO: 0.2 K/UL — SIGNIFICANT CHANGE UP (ref 0–0.5)
EOSINOPHIL NFR BLD AUTO: 2.2 % — SIGNIFICANT CHANGE UP (ref 0–6)
GLUCOSE BLDC GLUCOMTR-MCNC: 138 MG/DL — HIGH (ref 70–99)
GLUCOSE BLDC GLUCOMTR-MCNC: 212 MG/DL — HIGH (ref 70–99)
GLUCOSE SERPL-MCNC: 135 MG/DL — HIGH (ref 70–99)
HCT VFR BLD CALC: 41 % — SIGNIFICANT CHANGE UP (ref 34.5–45)
HGB BLD-MCNC: 12.9 G/DL — SIGNIFICANT CHANGE UP (ref 11.5–15.5)
LYMPHOCYTES # BLD AUTO: 2.6 K/UL — SIGNIFICANT CHANGE UP (ref 1–3.3)
LYMPHOCYTES # BLD AUTO: 32.2 % — SIGNIFICANT CHANGE UP (ref 13–44)
MCHC RBC-ENTMCNC: 26.1 PG — LOW (ref 27–34)
MCHC RBC-ENTMCNC: 31.4 GM/DL — LOW (ref 32–36)
MCV RBC AUTO: 83 FL — SIGNIFICANT CHANGE UP (ref 80–100)
MONOCYTES # BLD AUTO: 0.5 K/UL — SIGNIFICANT CHANGE UP (ref 0–0.9)
MONOCYTES NFR BLD AUTO: 6.8 % — SIGNIFICANT CHANGE UP (ref 2–14)
NEUTROPHILS # BLD AUTO: 4.7 K/UL — SIGNIFICANT CHANGE UP (ref 1.8–7.4)
NEUTROPHILS NFR BLD AUTO: 58.1 % — SIGNIFICANT CHANGE UP (ref 43–77)
PLATELET # BLD AUTO: 281 K/UL — SIGNIFICANT CHANGE UP (ref 150–400)
POTASSIUM SERPL-MCNC: 4.3 MMOL/L — SIGNIFICANT CHANGE UP (ref 3.5–5.3)
POTASSIUM SERPL-SCNC: 4.3 MMOL/L — SIGNIFICANT CHANGE UP (ref 3.5–5.3)
RBC # BLD: 4.94 M/UL — SIGNIFICANT CHANGE UP (ref 3.8–5.2)
RBC # FLD: 12.8 % — SIGNIFICANT CHANGE UP (ref 10.3–14.5)
SODIUM SERPL-SCNC: 138 MMOL/L — SIGNIFICANT CHANGE UP (ref 135–145)
WBC # BLD: 8.1 K/UL — SIGNIFICANT CHANGE UP (ref 3.8–10.5)
WBC # FLD AUTO: 8.1 K/UL — SIGNIFICANT CHANGE UP (ref 3.8–10.5)

## 2018-05-26 PROCEDURE — 80048 BASIC METABOLIC PNL TOTAL CA: CPT

## 2018-05-26 PROCEDURE — 85610 PROTHROMBIN TIME: CPT

## 2018-05-26 PROCEDURE — 85730 THROMBOPLASTIN TIME PARTIAL: CPT

## 2018-05-26 PROCEDURE — 87040 BLOOD CULTURE FOR BACTERIA: CPT

## 2018-05-26 PROCEDURE — 85014 HEMATOCRIT: CPT

## 2018-05-26 PROCEDURE — 93975 VASCULAR STUDY: CPT

## 2018-05-26 PROCEDURE — 81001 URINALYSIS AUTO W/SCOPE: CPT

## 2018-05-26 PROCEDURE — 82435 ASSAY OF BLOOD CHLORIDE: CPT

## 2018-05-26 PROCEDURE — 86780 TREPONEMA PALLIDUM: CPT

## 2018-05-26 PROCEDURE — 82803 BLOOD GASES ANY COMBINATION: CPT

## 2018-05-26 PROCEDURE — 87591 N.GONORRHOEAE DNA AMP PROB: CPT

## 2018-05-26 PROCEDURE — 85027 COMPLETE CBC AUTOMATED: CPT

## 2018-05-26 PROCEDURE — 80053 COMPREHEN METABOLIC PANEL: CPT

## 2018-05-26 PROCEDURE — 83605 ASSAY OF LACTIC ACID: CPT

## 2018-05-26 PROCEDURE — 93975 VASCULAR STUDY: CPT | Mod: 26

## 2018-05-26 PROCEDURE — 76830 TRANSVAGINAL US NON-OB: CPT

## 2018-05-26 PROCEDURE — 96375 TX/PRO/DX INJ NEW DRUG ADDON: CPT

## 2018-05-26 PROCEDURE — 96374 THER/PROPH/DIAG INJ IV PUSH: CPT | Mod: XU

## 2018-05-26 PROCEDURE — 82330 ASSAY OF CALCIUM: CPT

## 2018-05-26 PROCEDURE — 87389 HIV-1 AG W/HIV-1&-2 AB AG IA: CPT

## 2018-05-26 PROCEDURE — 76830 TRANSVAGINAL US NON-OB: CPT | Mod: 26

## 2018-05-26 PROCEDURE — 84132 ASSAY OF SERUM POTASSIUM: CPT

## 2018-05-26 PROCEDURE — 99285 EMERGENCY DEPT VISIT HI MDM: CPT | Mod: 25

## 2018-05-26 PROCEDURE — 74177 CT ABD & PELVIS W/CONTRAST: CPT

## 2018-05-26 PROCEDURE — 84295 ASSAY OF SERUM SODIUM: CPT

## 2018-05-26 PROCEDURE — 87086 URINE CULTURE/COLONY COUNT: CPT

## 2018-05-26 PROCEDURE — 82962 GLUCOSE BLOOD TEST: CPT

## 2018-05-26 PROCEDURE — 87491 CHLMYD TRACH DNA AMP PROBE: CPT

## 2018-05-26 PROCEDURE — 76856 US EXAM PELVIC COMPLETE: CPT

## 2018-05-26 PROCEDURE — 82947 ASSAY GLUCOSE BLOOD QUANT: CPT

## 2018-05-26 RX ORDER — ACETAMINOPHEN 500 MG
3 TABLET ORAL
Qty: 0 | Refills: 0 | DISCHARGE
Start: 2018-05-26

## 2018-05-26 RX ADMIN — Medication 110 MILLIGRAM(S): at 06:10

## 2018-05-26 RX ADMIN — HEPARIN SODIUM 5000 UNIT(S): 5000 INJECTION INTRAVENOUS; SUBCUTANEOUS at 05:34

## 2018-05-26 RX ADMIN — Medication 100 MILLIGRAM(S): at 09:38

## 2018-05-26 RX ADMIN — Medication 4: at 09:17

## 2018-05-26 RX ADMIN — Medication 100 GRAM(S): at 05:34

## 2018-05-26 RX ADMIN — SODIUM CHLORIDE 3 MILLILITER(S): 9 INJECTION INTRAMUSCULAR; INTRAVENOUS; SUBCUTANEOUS at 05:01

## 2018-05-26 NOTE — PROGRESS NOTE ADULT - PROBLEM SELECTOR PLAN 1
CV: Hemodynamically stable, no hypotension or tachycardia  Pulm: O2 Sat wnl  GI: Reg diet  : Voiding  ID: Afebrile, no leukocytosis, continue treatment of TOA w/ IV Cefotetan, doxycycline and flagyl.   TVUS today to evaluate for improvement  Heme: HSQ and ambulation for DVT ppx  Dispo: anticipate dc home today pending jose Calix, PGY2

## 2018-05-26 NOTE — PROGRESS NOTE ADULT - SUBJECTIVE AND OBJECTIVE BOX
Gyn Progress Note HD#3    Subjective:   Pt seen and examined at bedside. No events overnight. Pain well controlled. Patient ambulating. Passing flatus. Tolerating regular diet. Pt denies fever, chills, chest pain, SOB, nausea, vomiting, lightheadedness, dizziness.    Patient would like a BMP to ensure Creatinine is wnl    Objective:  T(F): 97.9 (05-26-18 @ 05:34), Max: 99.2 (05-25-18 @ 21:24)  HR: 106 (05-26-18 @ 05:34) (99 - 106)  BP: 101/67 (05-26-18 @ 05:34) (101/67 - 120/84)  RR: 16 (05-26-18 @ 05:34) (16 - 17)  SpO2: 100% (05-26-18 @ 05:34) (98% - 100%)  I&O's Summary    25 May 2018 07:01  -  26 May 2018 07:00  --------------------------------------------------------  IN: 1060 mL / OUT: 0 mL / NET: 1060 mL      CAPILLARY BLOOD GLUCOSE      POCT Blood Glucose.: 212 mg/dL (26 May 2018 09:08)  POCT Blood Glucose.: 283 mg/dL (25 May 2018 22:40)  POCT Blood Glucose.: 160 mg/dL (25 May 2018 20:27)  POCT Blood Glucose.: 149 mg/dL (25 May 2018 14:53)      MEDICATIONS  (STANDING):  cefoTEtan  IVPB      cefoTEtan  IVPB 2 Gram(s) IV Intermittent every 12 hours  dextrose 50% Injectable 12.5 Gram(s) IV Push once  dextrose 50% Injectable 25 Gram(s) IV Push once  dextrose 50% Injectable 25 Gram(s) IV Push once  doxycycline IVPB 100 milliGRAM(s) IV Intermittent every 12 hours  doxycycline IVPB      heparin  Injectable 5000 Unit(s) SubCutaneous every 12 hours  insulin lispro (HumaLOG) corrective regimen sliding scale   SubCutaneous three times a day before meals  insulin lispro (HumaLOG) corrective regimen sliding scale   SubCutaneous at bedtime  metroNIDAZOLE  IVPB      metroNIDAZOLE  IVPB 500 milliGRAM(s) IV Intermittent every 8 hours  sodium chloride 0.9% lock flush 3 milliLiter(s) IV Push every 8 hours    MEDICATIONS  (PRN):  acetaminophen   Tablet. 975 milliGRAM(s) Oral every 6 hours PRN Moderate Pain (4 - 6)  dextrose 40% Gel 15 Gram(s) Oral once PRN Blood Glucose LESS THAN 70 milliGRAM(s)/deciliter  glucagon  Injectable 1 milliGRAM(s) IntraMuscular once PRN Glucose LESS THAN 70 milligrams/deciliter      Physical Exam:  Constitutional: NAD, A+O x3  CV: RRR  Lungs: clear to auscultation bilaterally  Abdomen: soft, nontender, nondistended, no guarding, no rebound, normal bowel sounds  Extremities: no lower extremity edema or calf tenderness bilaterally; venodynes in place    LABS:                            12.9   8.1   )-----------( 281      ( 26 May 2018 07:18 )             41.0

## 2018-05-26 NOTE — PROGRESS NOTE ADULT - SUBJECTIVE AND OBJECTIVE BOX
OB Attending Note    S: Patient doing well.    O: Vital Signs Last 24 Hrs  T(C): 36.6 (26 May 2018 05:34), Max: 37.3 (25 May 2018 21:24)  T(F): 97.9 (26 May 2018 05:34), Max: 99.2 (25 May 2018 21:24)  HR: 106 (26 May 2018 05:34) (99 - 106)  BP: 101/67 (26 May 2018 05:34) (101/67 - 120/84)  BP(mean): --  RR: 16 (26 May 2018 05:34) (16 - 17)  SpO2: 100% (26 May 2018 05:34) (98% - 100%)    Gen: NAD  Abd: soft, NT, ND,    Labs:                        12.9   8.1   )-----------( 281      ( 26 May 2018 07:18 )             41.0       A: 27y HD #3 admitted for bilateral TOAs  pt clinicially improved  will get rpt sonogram as per admission plan  then d/c home on augmentin 2000 bid as per outpatient guidelines  f/u 2 weeks as scheduled  10minutes spent about discharge instructions  chem before d/c per request and with hx of DM to confirm labs stable

## 2018-05-28 LAB
CULTURE RESULTS: SIGNIFICANT CHANGE UP
CULTURE RESULTS: SIGNIFICANT CHANGE UP
SPECIMEN SOURCE: SIGNIFICANT CHANGE UP
SPECIMEN SOURCE: SIGNIFICANT CHANGE UP

## 2018-06-13 PROBLEM — Z00.00 ENCOUNTER FOR PREVENTIVE HEALTH EXAMINATION: Status: ACTIVE | Noted: 2018-06-13

## 2018-06-25 ENCOUNTER — APPOINTMENT (OUTPATIENT)
Dept: ULTRASOUND IMAGING | Facility: CLINIC | Age: 28
End: 2018-06-25
Payer: COMMERCIAL

## 2018-06-25 ENCOUNTER — OUTPATIENT (OUTPATIENT)
Dept: OUTPATIENT SERVICES | Facility: HOSPITAL | Age: 28
LOS: 1 days | End: 2018-06-25
Payer: COMMERCIAL

## 2018-06-25 DIAGNOSIS — N70.93 SALPINGITIS AND OOPHORITIS, UNSPECIFIED: ICD-10-CM

## 2018-06-25 PROCEDURE — 76856 US EXAM PELVIC COMPLETE: CPT | Mod: 26

## 2018-06-25 PROCEDURE — 76856 US EXAM PELVIC COMPLETE: CPT

## 2018-11-15 ENCOUNTER — APPOINTMENT (OUTPATIENT)
Dept: ULTRASOUND IMAGING | Facility: CLINIC | Age: 28
End: 2018-11-15
Payer: COMMERCIAL

## 2018-11-15 ENCOUNTER — OUTPATIENT (OUTPATIENT)
Dept: OUTPATIENT SERVICES | Facility: HOSPITAL | Age: 28
LOS: 1 days | End: 2018-11-15
Payer: COMMERCIAL

## 2018-11-15 DIAGNOSIS — Z00.8 ENCOUNTER FOR OTHER GENERAL EXAMINATION: ICD-10-CM

## 2018-11-15 PROCEDURE — 76830 TRANSVAGINAL US NON-OB: CPT

## 2018-11-15 PROCEDURE — 76830 TRANSVAGINAL US NON-OB: CPT | Mod: 26

## 2019-01-31 ENCOUNTER — RESULT REVIEW (OUTPATIENT)
Age: 29
End: 2019-01-31

## 2019-03-29 ENCOUNTER — RESULT REVIEW (OUTPATIENT)
Age: 29
End: 2019-03-29

## 2019-09-11 ENCOUNTER — RESULT REVIEW (OUTPATIENT)
Age: 29
End: 2019-09-11

## 2019-11-25 ENCOUNTER — TRANSCRIPTION ENCOUNTER (OUTPATIENT)
Age: 29
End: 2019-11-25

## 2020-03-16 ENCOUNTER — ASOB RESULT (OUTPATIENT)
Age: 30
End: 2020-03-16

## 2020-03-16 ENCOUNTER — APPOINTMENT (OUTPATIENT)
Dept: MATERNAL FETAL MEDICINE | Facility: CLINIC | Age: 30
End: 2020-03-16
Payer: COMMERCIAL

## 2020-03-16 ENCOUNTER — APPOINTMENT (OUTPATIENT)
Dept: MATERNAL FETAL MEDICINE | Facility: CLINIC | Age: 30
End: 2020-03-16

## 2020-03-16 PROCEDURE — G0108 DIAB MANAGE TRN  PER INDIV: CPT

## 2020-03-16 RX ORDER — INSULIN ASPART 100 [IU]/ML
100 INJECTION, SOLUTION INTRAVENOUS; SUBCUTANEOUS
Qty: 2 | Refills: 1 | Status: ACTIVE | COMMUNITY
Start: 2020-03-16 | End: 1900-01-01

## 2020-04-04 ENCOUNTER — APPOINTMENT (OUTPATIENT)
Dept: ANTEPARTUM | Facility: CLINIC | Age: 30
End: 2020-04-04
Payer: COMMERCIAL

## 2020-04-04 ENCOUNTER — ASOB RESULT (OUTPATIENT)
Age: 30
End: 2020-04-04

## 2020-04-04 PROCEDURE — 76801 OB US < 14 WKS SINGLE FETUS: CPT

## 2020-04-04 PROCEDURE — 36416 COLLJ CAPILLARY BLOOD SPEC: CPT

## 2020-04-04 PROCEDURE — 76813 OB US NUCHAL MEAS 1 GEST: CPT

## 2020-04-07 ENCOUNTER — APPOINTMENT (OUTPATIENT)
Dept: MATERNAL FETAL MEDICINE | Facility: CLINIC | Age: 30
End: 2020-04-07
Payer: COMMERCIAL

## 2020-04-07 PROCEDURE — 99442: CPT

## 2020-04-07 PROCEDURE — 98967 PH1 ASSMT&MGMT NQHP 11-20: CPT

## 2020-04-20 ENCOUNTER — APPOINTMENT (OUTPATIENT)
Dept: MATERNAL FETAL MEDICINE | Facility: CLINIC | Age: 30
End: 2020-04-20
Payer: COMMERCIAL

## 2020-04-20 PROCEDURE — 98967 PH1 ASSMT&MGMT NQHP 11-20: CPT

## 2020-04-20 PROCEDURE — 99442: CPT

## 2020-04-27 ENCOUNTER — APPOINTMENT (OUTPATIENT)
Dept: MATERNAL FETAL MEDICINE | Facility: CLINIC | Age: 30
End: 2020-04-27
Payer: COMMERCIAL

## 2020-04-27 ENCOUNTER — ASOB RESULT (OUTPATIENT)
Age: 30
End: 2020-04-27

## 2020-04-27 PROCEDURE — 98967 PH1 ASSMT&MGMT NQHP 11-20: CPT

## 2020-04-27 PROCEDURE — 99442: CPT

## 2020-05-11 ENCOUNTER — APPOINTMENT (OUTPATIENT)
Dept: MATERNAL FETAL MEDICINE | Facility: CLINIC | Age: 30
End: 2020-05-11
Payer: COMMERCIAL

## 2020-05-11 PROCEDURE — 99442: CPT

## 2020-05-11 PROCEDURE — 98967 PH1 ASSMT&MGMT NQHP 11-20: CPT

## 2020-05-21 ENCOUNTER — OUTPATIENT (OUTPATIENT)
Dept: OUTPATIENT SERVICES | Age: 30
LOS: 1 days | Discharge: HOME | End: 2020-05-21

## 2020-05-23 ENCOUNTER — APPOINTMENT (OUTPATIENT)
Dept: ANTEPARTUM | Facility: CLINIC | Age: 30
End: 2020-05-23
Payer: COMMERCIAL

## 2020-05-23 ENCOUNTER — ASOB RESULT (OUTPATIENT)
Age: 30
End: 2020-05-23

## 2020-05-23 PROCEDURE — 76811 OB US DETAILED SNGL FETUS: CPT

## 2020-05-25 ENCOUNTER — OUTPATIENT (OUTPATIENT)
Dept: INPATIENT UNIT | Facility: HOSPITAL | Age: 30
LOS: 1 days | Discharge: HOME | End: 2020-05-25
Payer: COMMERCIAL

## 2020-05-25 VITALS — DIASTOLIC BLOOD PRESSURE: 89 MMHG | HEART RATE: 137 BPM | SYSTOLIC BLOOD PRESSURE: 139 MMHG

## 2020-05-25 VITALS — HEART RATE: 100 BPM | SYSTOLIC BLOOD PRESSURE: 119 MMHG | DIASTOLIC BLOOD PRESSURE: 72 MMHG

## 2020-05-25 DIAGNOSIS — Z3A.00 WEEKS OF GESTATION OF PREGNANCY NOT SPECIFIED: ICD-10-CM

## 2020-05-25 DIAGNOSIS — O26.899 OTHER SPECIFIED PREGNANCY RELATED CONDITIONS, UNSPECIFIED TRIMESTER: ICD-10-CM

## 2020-05-25 LAB
APPEARANCE UR: CLEAR — SIGNIFICANT CHANGE UP
BILIRUB UR-MCNC: NEGATIVE — SIGNIFICANT CHANGE UP
BLOOD UR QL VISUAL: NEGATIVE — SIGNIFICANT CHANGE UP
COLOR SPEC: COLORLESS — SIGNIFICANT CHANGE UP
GLUCOSE UR-MCNC: NEGATIVE — SIGNIFICANT CHANGE UP
KETONES UR-MCNC: NEGATIVE — SIGNIFICANT CHANGE UP
LEUKOCYTE ESTERASE UR-ACNC: NEGATIVE — SIGNIFICANT CHANGE UP
NITRITE UR-MCNC: NEGATIVE — SIGNIFICANT CHANGE UP
PH UR: 7 — SIGNIFICANT CHANGE UP (ref 5–8)
PROT UR-MCNC: NEGATIVE — SIGNIFICANT CHANGE UP
SP GR SPEC: 1 — SIGNIFICANT CHANGE UP (ref 1–1.04)
UROBILINOGEN FLD QL: NORMAL — SIGNIFICANT CHANGE UP

## 2020-05-25 PROCEDURE — 76817 TRANSVAGINAL US OBSTETRIC: CPT | Mod: 26

## 2020-05-25 PROCEDURE — 76815 OB US LIMITED FETUS(S): CPT | Mod: 26

## 2020-05-25 PROCEDURE — 99202 OFFICE O/P NEW SF 15 MIN: CPT | Mod: 25

## 2020-05-25 NOTE — OB PROVIDER TRIAGE NOTE - NSOBPROVIDERNOTE_OBGYN_ALL_OB_FT
28 y/o Kuwaiti  @ 19.5 wks gestation presents with c/o ?PPROM since last evening states has been leaking clear white discharge since last evening denies any uc's or vb reports +FM denies any recent intercourse denies any n/v/d denies any fever or chills denies any recent sick exposure pt states was treated for a UTI and BV 2 wks ago and completed medication this is an IVF pregnancy 2/2 endometriosis otherwise no other ap care comp at this time         abdomen: soft, nt on palp  SSE: small amt white thick discharged noted likely c/w yeast   no evidence of pooling  fern- neg   nitrazine- neg   cervix appears closed and posterior   cervix appears friable   T(C): 37 (05-25-20 @ 13:51), Max: 37 (05-25-20 @ 13:51)  HR: 110 (05-25-20 @ 14:08) (110 - 137)  BP: 139/89 (05-25-20 @ 13:51) (139/89 - 139/89)  RR: 16 (05-25-20 @ 13:51) (16 - 16)  SpO2: 100% (05-25-20 @ 14:08) (100% - 100%)  TAS: FH- 160 bpm posterior placenta MVP: 3.18 FL: 20w1d        NKA  med hx:   type 1 DM - insulin pump since age 15   surg hx: denies  gyn hx:   endometriosis   IVF 2/2 endometriosis  meds:   PNV         awaiting urinalysis 28 y/o Israeli  @ 19.5 wks gestation presents with c/o ?PPROM since last evening states has been leaking clear white discharge since last evening denies any uc's or vb reports +FM denies any recent intercourse denies any n/v/d denies any fever or chills denies any recent sick exposure pt states was treated for a UTI and BV 2 wks ago and completed medication this is an IVF pregnancy 2/2 endometriosis otherwise no other ap care comp at this time         abdomen: soft, nt on palp  SSE: small amt white thick discharged noted likely c/w yeast   no evidence of pooling  fern- neg   nitrazine- neg   cervix appears closed and posterior   cervix appears friable   T(C): 37 (05-25-20 @ 13:51), Max: 37 (05-25-20 @ 13:51)  HR: 110 (05-25-20 @ 14:08) (110 - 137)  BP: 139/89 (05-25-20 @ 13:51) (139/89 - 139/89)  RR: 16 (05-25-20 @ 13:51) (16 - 16)  SpO2: 100% (05-25-20 @ 14:08) (100% - 100%)  TAS: FH- 160 bpm posterior placenta MVP: 3.18 FL: 20w1d  TVS: 4.84-5.27 cm no dynamic changes  toco: no uterine contractions noted         NKA  med hx:   type 1 DM - insulin pump since age 15   surg hx: denies  gyn hx:   endometriosis   IVF 2/2 endometriosis  meds:   PNV         awaiting urinalysis 30 y/o Czech  @ 19.5 wks gestation presents with c/o ?PPROM since last evening states has been leaking clear white discharge since last evening denies any uc's or vb reports +FM denies any recent intercourse denies any n/v/d denies any fever or chills denies any recent sick exposure pt states was treated for a UTI and BV 2 wks ago and completed medication this is an IVF pregnancy 2/2 endometriosis otherwise no other ap care comp at this time       abdomen: soft, nt on palp  SSE: small amt white thick discharged noted likely c/w yeast   no evidence of pooling  fern- neg   nitrazine- neg   cervix appears closed and posterior   cervix appears friable   T(C): 37 (-20 @ 13:51), Max: 37 (-25-20 @ 13:51)  HR: 110 (-25-20 @ 14:08) (110 - 137)  BP: 139/89 (05-25-20 @ 13:51) (139/89 - 139/89)  RR: 16 (-20 @ 13:51) (16 - 16)  SpO2: 100% (-25-20 @ 14:08) (100% - 100%)  TAS: FH- 160 bpm posterior placenta MVP: 3.18 FL: 20w1d  TVS: 4.84-5.27 cm no dynamic changes  toco: no uterine contractions noted     NKA  med hx:   type 1 DM - insulin pump since age 15   surg hx: denies  gyn hx:   endometriosis   IVF 2/2 endometriosis  meds:   PNV         awaiting urinalysis  Urinalysis Basic - ( 25 May 2020 14:04 )    Color: COLORLESS / Appearance: CLEAR / S.005 / pH: 7.0  Gluc: NEGATIVE / Ketone: NEGATIVE  / Bili: NEGATIVE / Urobili: NORMAL   Blood: NEGATIVE / Protein: NEGATIVE / Nitrite: NEGATIVE   Leuk Esterase: NEGATIVE / RBC: x / WBC x   Sq Epi: x / Non Sq Epi: x / Bacteria: x    no evidence of UTI, pprom or ptl  discharge likely yeast  plan discussed with dr somers  rx for terazole 7 given to patient  pprom/ ptl precautions reviewed with patient  follow up with dr france as scheduled  pt verbalized understanding of instructions given  discharged home

## 2020-05-25 NOTE — OB RN TRIAGE NOTE - CHIEF COMPLAINT QUOTE
"  Physical Therapy Daily Treatment Note     Name: Alejandro Daniels  Clinic Number: [id number]    I think I broke my water at around 8 am Verónica Savage, PT

## 2020-05-25 NOTE — OB PROVIDER TRIAGE NOTE - NSPRENATALCARE_OBGYN_ALL_OB
Colonoscopy Procedure Note    Procedure: Colonoscopy     Instrument Used:  Olympus  colonoscope    Medications:  Fentanyl 150  mcg IV, Versed 5 mg IV    Post-operative Diagnosis:      1. Normal exam    Indication: Kyle Laughlin is a 52 year old male presenting for surveillance, history of adenomatous colon polyps, last exam in 2013 with several small adenomatous and hyperplastic polyps removed.     Procedure Description:  As part of an informed consent discussion, the risks and benefits of the procedure were explained to patient/POA who demonstrated an understanding and consented to the procedure.  Informed consent discussion included discussion of risks of sedation, polypectomy, perforation, and the risk of missed polyps and cancers.  The history was reviewed and the patient was examined. The patient was deemed stable for the procedure and monitored throughout.     Perianal inspection and digital rectal exam were performed. The endoscope was then introduced into the rectum and advanced to the cecum, identified by the IC valve and the appendiceal orifice.  The endoscope was slowly withdrawn with careful inspection of the mucosa including retroflexed view of the rectum.  All findings and interventions are listed below.    Following the procedure, the colon was decompressed, the endoscope removed and the procedure was terminated.    Prep quality:  Excellent    FINDINGS AND INTERVENTIONS::      TI: not visualized.  Cecum: normal  Ascending Colon: normal  Transverse Colon: normal  Descending Colon: normal  Sigmoid Colon: normal  Rectum: normal  Retroflexed view of the rectum: no significant internal hemorrhoids were seen    Biopsy: no  Estimated blood loss:  <10cc   Photographs of cecal landmarks:  Yes  Withdrawal greater than 6 minutes: Yes    Event Tracking     Panel 1     Procedure : COLONOSCOPY      Event Time In    Procedure Start 0916    Scope In 0925    Cecum Reached 0930    Scope Out 0944    Procedure End  0944             RECOMMENDATIONS:    Patient should have a repeat screening colonoscopy in 10 years.      John Peñaloza MD        Cc: PCP, Referring physician.  --------------------------------------------------------  Demographics:  Kyle Laughlin  1965  [id number]    Kapil Jacobsen MD    :  John Peñaloza MD   ----------------------------------------     Yes

## 2020-05-25 NOTE — OB PROVIDER TRIAGE NOTE - ADDITIONAL INSTRUCTIONS
no evidence of UTI, pprom or ptl  discharge likely yeast  plan discussed with dr somers  rx for terazole 7 given to patient  pprom/ ptl precautions reviewed with patient  follow up with dr france as scheduled  pt verbalized understanding of instructions given  discharged home

## 2020-05-26 ENCOUNTER — APPOINTMENT (OUTPATIENT)
Dept: MATERNAL FETAL MEDICINE | Facility: CLINIC | Age: 30
End: 2020-05-26
Payer: COMMERCIAL

## 2020-05-26 PROCEDURE — 98967 PH1 ASSMT&MGMT NQHP 11-20: CPT

## 2020-05-26 PROCEDURE — 99442: CPT

## 2020-05-27 ENCOUNTER — APPOINTMENT (OUTPATIENT)
Dept: PEDIATRIC CARDIOLOGY | Facility: CLINIC | Age: 30
End: 2020-05-27
Payer: COMMERCIAL

## 2020-05-27 DIAGNOSIS — O24.811: ICD-10-CM

## 2020-05-27 PROBLEM — N80.9 ENDOMETRIOSIS, UNSPECIFIED: Chronic | Status: ACTIVE | Noted: 2020-05-25

## 2020-05-27 PROCEDURE — 76825 ECHO EXAM OF FETAL HEART: CPT

## 2020-05-27 PROCEDURE — 76827 ECHO EXAM OF FETAL HEART: CPT

## 2020-05-27 PROCEDURE — 93325 DOPPLER ECHO COLOR FLOW MAPG: CPT

## 2020-06-08 ENCOUNTER — ASOB RESULT (OUTPATIENT)
Age: 30
End: 2020-06-08

## 2020-06-08 ENCOUNTER — APPOINTMENT (OUTPATIENT)
Dept: MATERNAL FETAL MEDICINE | Facility: CLINIC | Age: 30
End: 2020-06-08
Payer: COMMERCIAL

## 2020-06-08 PROCEDURE — G0108 DIAB MANAGE TRN  PER INDIV: CPT | Mod: 95

## 2020-06-22 ENCOUNTER — ASOB RESULT (OUTPATIENT)
Age: 30
End: 2020-06-22

## 2020-06-22 ENCOUNTER — APPOINTMENT (OUTPATIENT)
Dept: MATERNAL FETAL MEDICINE | Facility: CLINIC | Age: 30
End: 2020-06-22

## 2020-07-06 ENCOUNTER — APPOINTMENT (OUTPATIENT)
Dept: MATERNAL FETAL MEDICINE | Facility: CLINIC | Age: 30
End: 2020-07-06
Payer: COMMERCIAL

## 2020-07-06 ENCOUNTER — ASOB RESULT (OUTPATIENT)
Age: 30
End: 2020-07-06

## 2020-07-06 PROCEDURE — G0108 DIAB MANAGE TRN  PER INDIV: CPT | Mod: 95

## 2020-07-10 ENCOUNTER — APPOINTMENT (OUTPATIENT)
Dept: ANTEPARTUM | Facility: CLINIC | Age: 30
End: 2020-07-10
Payer: COMMERCIAL

## 2020-07-10 ENCOUNTER — ASOB RESULT (OUTPATIENT)
Age: 30
End: 2020-07-10

## 2020-07-10 PROCEDURE — 76816 OB US FOLLOW-UP PER FETUS: CPT

## 2020-07-17 NOTE — ED ADULT NURSE NOTE - PRO INTERPRETER NEED 2
"Zara Generous Patient Age: 66year old  MESSAGE: Interpreting service used: No      IM/FP- Symptom-  Adult-  Yellow Symptom-     Blood pressure- changes or problems and Dizziness           Appointment: Caller declined making an appointment before speaking with the nurse. Informed patient that after speaking with the nurse, if it is determined an appointment is needed you will be connected back the  to make the appointment. Caller connected to triage- Yes- [phone] [address] call to Call [address] [facility] Triage ([phone])     WEIGHT AND HEIGHT:   Wt Readings from Last 1 Encounters:   06/09/20 92.5 kg (204 lb)     Ht Readings from Last 1 Encounters:   06/09/20 5' 8"" (1.727 m)     BMI Readings from Last 1 Encounters:   06/09/20 31.02 kg/mÂ²       ALLERGIES: no known allergies.   Current Outpatient Medications   Medication   â¢ fluticasone (FLONASE) 50 MCG/ACT nasal spray   â¢ DULoxetine (CYMBALTA) 60 MG capsule   â¢ lisinopril (ZESTRIL) 20 MG tablet   â¢ omeprazole (PRILOSEC) 20 MG capsule   â¢ atorvastatin (LIPITOR) 40 MG tablet   â¢ traZODone (DESYREL) 50 MG tablet   â¢ levocetirizine (XYZAL) 5 MG tablet   â¢ tiZANidine (ZANAFLEX) 2 MG tablet   â¢ AMIODarone (PACERONE) 200 MG tablet   â¢ metFORMIN (GLUCOPHAGE) 500 MG tablet   â¢ Vitamin D, Ergocalciferol, 1.25 mg (50,000 units) capsule   â¢ busPIRone (BUSPAR) 30 MG tablet   â¢ traMADol (ULTRAM) 50 MG tablet   â¢ gabapentin (NEURONTIN) 300 MG capsule   â¢ fluticasone (FLONASE) 50 MCG/ACT nasal spray   â¢ tamsulosin (FLOMAX) 0.4 MG Cap   â¢ Acidophilus Lactobacillus Cap   â¢ HYDROcodone-acetaminophen (NORCO)  MG per tablet   â¢ metoPROLOL succinate (TOPROL-XL) 50 MG 24 hr tablet   â¢ doxazosin (CARDURA) 4 MG tablet   â¢ furosemide (LASIX) 20 MG tablet   â¢ apixaBAN (ELIQUIS) 5 MG Tab   â¢ albuterol 108 (90 Base) MCG/ACT inhaler   â¢ albuterol-ipratropium (COMBIVENT RESPIMAT) 100-20 MCG/ACT inhaler     No current facility-administered " medications for this visit. PHARMACY to use:  Shown below          Pharmacy preference(s) on file:   [address] [facility], [address] [place] [address]  Phone: [phone] Fax: [phone]      CALL BACK INFO: Melita Teetee to leave response (including medical information) with family member or on answering machine  ROUTING: Patient's physician/staff        PCP: Tennille Deleon MD         INS: Payor: Robert Ma / Plan: [facility] SELECT HMO / Product Type: PPO MISC   PATIENT ADDRESS:  [address] [place] [address] Dr Annie Leavitt [phone] [place]

## 2020-07-20 ENCOUNTER — ASOB RESULT (OUTPATIENT)
Age: 30
End: 2020-07-20

## 2020-07-20 ENCOUNTER — APPOINTMENT (OUTPATIENT)
Dept: MATERNAL FETAL MEDICINE | Facility: CLINIC | Age: 30
End: 2020-07-20
Payer: COMMERCIAL

## 2020-07-22 ENCOUNTER — APPOINTMENT (OUTPATIENT)
Dept: ANTEPARTUM | Facility: CLINIC | Age: 30
End: 2020-07-22
Payer: COMMERCIAL

## 2020-07-22 ENCOUNTER — ASOB RESULT (OUTPATIENT)
Age: 30
End: 2020-07-22

## 2020-07-22 PROCEDURE — 99214 OFFICE O/P EST MOD 30 MIN: CPT | Mod: 95

## 2020-07-22 PROCEDURE — G0108 DIAB MANAGE TRN  PER INDIV: CPT | Mod: 95

## 2020-08-02 ENCOUNTER — OUTPATIENT (OUTPATIENT)
Dept: OUTPATIENT SERVICES | Facility: HOSPITAL | Age: 30
LOS: 1 days | Discharge: HOME | End: 2020-08-02
Payer: COMMERCIAL

## 2020-08-02 VITALS — OXYGEN SATURATION: 98 % | HEART RATE: 111 BPM

## 2020-08-02 VITALS — TEMPERATURE: 98 F

## 2020-08-02 DIAGNOSIS — Z3A.00 WEEKS OF GESTATION OF PREGNANCY NOT SPECIFIED: ICD-10-CM

## 2020-08-02 DIAGNOSIS — O26.899 OTHER SPECIFIED PREGNANCY RELATED CONDITIONS, UNSPECIFIED TRIMESTER: ICD-10-CM

## 2020-08-02 LAB — GLUCOSE BLDC GLUCOMTR-MCNC: 128 MG/DL — HIGH (ref 70–99)

## 2020-08-02 PROCEDURE — 93010 ELECTROCARDIOGRAM REPORT: CPT

## 2020-08-02 NOTE — OB PROVIDER TRIAGE NOTE - NSOBPROVIDERNOTE_OBGYN_ALL_OB_FT
29.4 weeks gestation( IVF)  who presents with decreased FM today.   pt is pregestational DM on insulin pump and Metformin  stated FS  well controlled   denied any ROM VB  denied any contractions    1645p Maternal tachycardia 130 on adm              EKG sinus tachycardia  otherwise normal EKG   1645p   ate at 3p    1745  -120   NST reactive   feels more FM

## 2020-08-02 NOTE — OB PROVIDER TRIAGE NOTE - NSHPPHYSICALEXAM_GEN_ALL_CORE
pt seen and examined    ICU Vital Signs Last 24 Hrs  T(C): 36.9 (02 Aug 2020 16:39), Max: 36.9 (02 Aug 2020 16:33)  T(F): 98.4 (02 Aug 2020 16:39), Max: 98.42 (02 Aug 2020 16:33)  HR: 119 (02 Aug 2020 17:59) (107 - 130)  BP: 114/74 (02 Aug 2020 17:57) (102/72 - 129/81)  BP(mean): --  ABP: --  ABP(mean): --  RR: 16 (02 Aug 2020 16:39) (16 - 16)  SpO2: 98% (02 Aug 2020 18:04) (98% - 100%)    pt in NAD   alert OX3   lungs clear   Heart S1 S2     placed on EFM   maternal HR upon arrival 130    EKG sinus tachycardia  normal EKG    feels FM while in triage  scan transverse  AAFI BPP 8/8 sees and feels fm during scan

## 2020-08-02 NOTE — OB PROVIDER TRIAGE NOTE - HISTORY OF PRESENT ILLNESS
"my baby is moving less intensely than normal"  FH check in [facility] 157bpm   T Db YANG CC "my baby is moving less intensely than normal"  FH check in [facility] 157bpm    T Db NP    29 year old female P0 at 29.4 weeks gestation( IVF)  who presents with decreased FM today    pt is pregestational DM on insulin pump and Metformin  stated FS  well controlled   denied any ROM VB  denied any contractions     med hx Pregestational DM  dx 2005               endometriosis   surghx denied   meds pnvqd   allergies nkda

## 2020-08-03 ENCOUNTER — APPOINTMENT (OUTPATIENT)
Dept: MATERNAL FETAL MEDICINE | Facility: CLINIC | Age: 30
End: 2020-08-03
Payer: COMMERCIAL

## 2020-08-03 ENCOUNTER — ASOB RESULT (OUTPATIENT)
Age: 30
End: 2020-08-03

## 2020-08-03 PROCEDURE — G0108 DIAB MANAGE TRN  PER INDIV: CPT | Mod: 95

## 2020-08-04 PROBLEM — E11.9 TYPE 2 DIABETES MELLITUS WITHOUT COMPLICATIONS: Chronic | Status: ACTIVE | Noted: 2018-05-24

## 2020-08-05 ENCOUNTER — APPOINTMENT (OUTPATIENT)
Dept: ANTEPARTUM | Facility: CLINIC | Age: 30
End: 2020-08-05
Payer: COMMERCIAL

## 2020-08-05 ENCOUNTER — ASOB RESULT (OUTPATIENT)
Age: 30
End: 2020-08-05

## 2020-08-05 PROCEDURE — 76819 FETAL BIOPHYS PROFIL W/O NST: CPT

## 2020-08-05 PROCEDURE — 76816 OB US FOLLOW-UP PER FETUS: CPT

## 2020-08-17 ENCOUNTER — APPOINTMENT (OUTPATIENT)
Dept: MATERNAL FETAL MEDICINE | Facility: CLINIC | Age: 30
End: 2020-08-17
Payer: COMMERCIAL

## 2020-08-17 ENCOUNTER — ASOB RESULT (OUTPATIENT)
Age: 30
End: 2020-08-17

## 2020-08-17 PROCEDURE — G0108 DIAB MANAGE TRN  PER INDIV: CPT | Mod: 95

## 2020-08-18 ENCOUNTER — OUTPATIENT (OUTPATIENT)
Dept: OUTPATIENT SERVICES | Facility: HOSPITAL | Age: 30
LOS: 1 days | End: 2020-08-18

## 2020-08-18 ENCOUNTER — APPOINTMENT (OUTPATIENT)
Dept: ANTEPARTUM | Facility: CLINIC | Age: 30
End: 2020-08-18
Payer: COMMERCIAL

## 2020-08-18 ENCOUNTER — ASOB RESULT (OUTPATIENT)
Age: 30
End: 2020-08-18

## 2020-08-18 ENCOUNTER — APPOINTMENT (OUTPATIENT)
Dept: ANTEPARTUM | Facility: HOSPITAL | Age: 30
End: 2020-08-18

## 2020-08-18 PROCEDURE — 76818 FETAL BIOPHYS PROFILE W/NST: CPT | Mod: 26

## 2020-08-25 ENCOUNTER — APPOINTMENT (OUTPATIENT)
Dept: ANTEPARTUM | Facility: CLINIC | Age: 30
End: 2020-08-25

## 2020-08-25 ENCOUNTER — OUTPATIENT (OUTPATIENT)
Dept: OUTPATIENT SERVICES | Facility: HOSPITAL | Age: 30
LOS: 1 days | End: 2020-08-25

## 2020-08-25 ENCOUNTER — APPOINTMENT (OUTPATIENT)
Dept: ANTEPARTUM | Facility: CLINIC | Age: 30
End: 2020-08-25
Payer: COMMERCIAL

## 2020-08-25 ENCOUNTER — ASOB RESULT (OUTPATIENT)
Age: 30
End: 2020-08-25

## 2020-08-25 ENCOUNTER — APPOINTMENT (OUTPATIENT)
Dept: ANTEPARTUM | Facility: HOSPITAL | Age: 30
End: 2020-08-25

## 2020-08-25 PROCEDURE — 76818 FETAL BIOPHYS PROFILE W/NST: CPT | Mod: 26

## 2020-08-25 PROCEDURE — 76816 OB US FOLLOW-UP PER FETUS: CPT

## 2020-08-31 ENCOUNTER — APPOINTMENT (OUTPATIENT)
Dept: ANTEPARTUM | Facility: HOSPITAL | Age: 30
End: 2020-08-31

## 2020-08-31 ENCOUNTER — APPOINTMENT (OUTPATIENT)
Dept: MATERNAL FETAL MEDICINE | Facility: CLINIC | Age: 30
End: 2020-08-31
Payer: COMMERCIAL

## 2020-08-31 ENCOUNTER — ASOB RESULT (OUTPATIENT)
Age: 30
End: 2020-08-31

## 2020-08-31 ENCOUNTER — APPOINTMENT (OUTPATIENT)
Dept: ANTEPARTUM | Facility: CLINIC | Age: 30
End: 2020-08-31
Payer: COMMERCIAL

## 2020-08-31 ENCOUNTER — OUTPATIENT (OUTPATIENT)
Dept: OUTPATIENT SERVICES | Facility: HOSPITAL | Age: 30
LOS: 1 days | End: 2020-08-31

## 2020-08-31 VITALS — DIASTOLIC BLOOD PRESSURE: 82 MMHG | SYSTOLIC BLOOD PRESSURE: 127 MMHG | HEART RATE: 101 BPM

## 2020-08-31 PROCEDURE — G0108 DIAB MANAGE TRN  PER INDIV: CPT | Mod: 95

## 2020-08-31 PROCEDURE — 76818 FETAL BIOPHYS PROFILE W/NST: CPT | Mod: 26

## 2020-09-08 ENCOUNTER — ASOB RESULT (OUTPATIENT)
Age: 30
End: 2020-09-08

## 2020-09-08 ENCOUNTER — OUTPATIENT (OUTPATIENT)
Dept: OUTPATIENT SERVICES | Facility: HOSPITAL | Age: 30
LOS: 1 days | End: 2020-09-08

## 2020-09-08 ENCOUNTER — NON-APPOINTMENT (OUTPATIENT)
Age: 30
End: 2020-09-08

## 2020-09-08 ENCOUNTER — APPOINTMENT (OUTPATIENT)
Dept: ANTEPARTUM | Facility: HOSPITAL | Age: 30
End: 2020-09-08

## 2020-09-08 ENCOUNTER — APPOINTMENT (OUTPATIENT)
Dept: ANTEPARTUM | Facility: CLINIC | Age: 30
End: 2020-09-08
Payer: COMMERCIAL

## 2020-09-08 PROCEDURE — 76818 FETAL BIOPHYS PROFILE W/NST: CPT | Mod: 26

## 2020-09-14 ENCOUNTER — APPOINTMENT (OUTPATIENT)
Dept: ANTEPARTUM | Facility: HOSPITAL | Age: 30
End: 2020-09-14

## 2020-09-14 ENCOUNTER — APPOINTMENT (OUTPATIENT)
Dept: ANTEPARTUM | Facility: CLINIC | Age: 30
End: 2020-09-14
Payer: COMMERCIAL

## 2020-09-14 ENCOUNTER — ASOB RESULT (OUTPATIENT)
Age: 30
End: 2020-09-14

## 2020-09-14 ENCOUNTER — OUTPATIENT (OUTPATIENT)
Dept: OUTPATIENT SERVICES | Facility: HOSPITAL | Age: 30
LOS: 1 days | End: 2020-09-14

## 2020-09-14 ENCOUNTER — APPOINTMENT (OUTPATIENT)
Dept: MATERNAL FETAL MEDICINE | Facility: CLINIC | Age: 30
End: 2020-09-14
Payer: COMMERCIAL

## 2020-09-14 PROCEDURE — 76818 FETAL BIOPHYS PROFILE W/NST: CPT | Mod: 26

## 2020-09-14 PROCEDURE — 76816 OB US FOLLOW-UP PER FETUS: CPT

## 2020-09-14 PROCEDURE — G0108 DIAB MANAGE TRN  PER INDIV: CPT | Mod: 95

## 2020-09-14 PROCEDURE — 99241 OFFICE CONSULTATION NEW/ESTAB PATIENT 15 MIN: CPT | Mod: 25

## 2020-09-17 ENCOUNTER — APPOINTMENT (OUTPATIENT)
Dept: ANTEPARTUM | Facility: HOSPITAL | Age: 30
End: 2020-09-17

## 2020-09-21 ENCOUNTER — APPOINTMENT (OUTPATIENT)
Dept: ANTEPARTUM | Facility: HOSPITAL | Age: 30
End: 2020-09-21

## 2020-09-21 ENCOUNTER — ASOB RESULT (OUTPATIENT)
Age: 30
End: 2020-09-21

## 2020-09-21 ENCOUNTER — OUTPATIENT (OUTPATIENT)
Dept: OUTPATIENT SERVICES | Facility: HOSPITAL | Age: 30
LOS: 1 days | End: 2020-09-21

## 2020-09-21 ENCOUNTER — APPOINTMENT (OUTPATIENT)
Dept: ANTEPARTUM | Facility: CLINIC | Age: 30
End: 2020-09-21
Payer: COMMERCIAL

## 2020-09-21 PROCEDURE — 76818 FETAL BIOPHYS PROFILE W/NST: CPT | Mod: 26

## 2020-09-24 ENCOUNTER — APPOINTMENT (OUTPATIENT)
Dept: ANTEPARTUM | Facility: HOSPITAL | Age: 30
End: 2020-09-24

## 2020-09-26 ENCOUNTER — TRANSCRIPTION ENCOUNTER (OUTPATIENT)
Age: 30
End: 2020-09-26

## 2020-09-27 ENCOUNTER — TRANSCRIPTION ENCOUNTER (OUTPATIENT)
Age: 30
End: 2020-09-27

## 2020-09-27 ENCOUNTER — INPATIENT (INPATIENT)
Facility: HOSPITAL | Age: 30
LOS: 2 days | Discharge: HOME | End: 2020-09-30
Attending: OBSTETRICS & GYNECOLOGY | Admitting: OBSTETRICS & GYNECOLOGY
Payer: COMMERCIAL

## 2020-09-27 DIAGNOSIS — Z3A.00 WEEKS OF GESTATION OF PREGNANCY NOT SPECIFIED: ICD-10-CM

## 2020-09-27 DIAGNOSIS — O26.899 OTHER SPECIFIED PREGNANCY RELATED CONDITIONS, UNSPECIFIED TRIMESTER: ICD-10-CM

## 2020-09-27 DIAGNOSIS — O42.90 PREMATURE RUPTURE OF MEMBRANES, UNSPECIFIED AS TO LENGTH OF TIME BETWEEN RUPTURE AND ONSET OF LABOR, UNSPECIFIED WEEKS OF GESTATION: ICD-10-CM

## 2020-09-27 DIAGNOSIS — Z98.890 OTHER SPECIFIED POSTPROCEDURAL STATES: Chronic | ICD-10-CM

## 2020-09-27 LAB
BASOPHILS # BLD AUTO: 0.02 K/UL — SIGNIFICANT CHANGE UP (ref 0–0.2)
BASOPHILS NFR BLD AUTO: 0.3 % — SIGNIFICANT CHANGE UP (ref 0–2)
BLD GP AB SCN SERPL QL: NEGATIVE — SIGNIFICANT CHANGE UP
EOSINOPHIL # BLD AUTO: 0.04 K/UL — SIGNIFICANT CHANGE UP (ref 0–0.5)
EOSINOPHIL NFR BLD AUTO: 0.5 % — SIGNIFICANT CHANGE UP (ref 0–6)
GLUCOSE BLDC GLUCOMTR-MCNC: 105 MG/DL — HIGH (ref 70–99)
GLUCOSE BLDC GLUCOMTR-MCNC: 110 MG/DL — HIGH (ref 70–99)
GLUCOSE BLDC GLUCOMTR-MCNC: 171 MG/DL — HIGH (ref 70–99)
GLUCOSE BLDC GLUCOMTR-MCNC: 80 MG/DL — SIGNIFICANT CHANGE UP (ref 70–99)
HCT VFR BLD CALC: 33.7 % — LOW (ref 34.5–45)
HCT VFR BLD CALC: 36.3 % — SIGNIFICANT CHANGE UP (ref 34.5–45)
HGB BLD-MCNC: 11.1 G/DL — LOW (ref 11.5–15.5)
HGB BLD-MCNC: 11.8 G/DL — SIGNIFICANT CHANGE UP (ref 11.5–15.5)
IMM GRANULOCYTES NFR BLD AUTO: 0.4 % — SIGNIFICANT CHANGE UP (ref 0–1.5)
LYMPHOCYTES # BLD AUTO: 1.75 K/UL — SIGNIFICANT CHANGE UP (ref 1–3.3)
LYMPHOCYTES # BLD AUTO: 22.4 % — SIGNIFICANT CHANGE UP (ref 13–44)
MCHC RBC-ENTMCNC: 27.9 PG — SIGNIFICANT CHANGE UP (ref 27–34)
MCHC RBC-ENTMCNC: 28 PG — SIGNIFICANT CHANGE UP (ref 27–34)
MCHC RBC-ENTMCNC: 32.5 % — SIGNIFICANT CHANGE UP (ref 32–36)
MCHC RBC-ENTMCNC: 32.9 % — SIGNIFICANT CHANGE UP (ref 32–36)
MCV RBC AUTO: 85.1 FL — SIGNIFICANT CHANGE UP (ref 80–100)
MCV RBC AUTO: 85.8 FL — SIGNIFICANT CHANGE UP (ref 80–100)
MONOCYTES # BLD AUTO: 0.55 K/UL — SIGNIFICANT CHANGE UP (ref 0–0.9)
MONOCYTES NFR BLD AUTO: 7 % — SIGNIFICANT CHANGE UP (ref 2–14)
NEUTROPHILS # BLD AUTO: 5.43 K/UL — SIGNIFICANT CHANGE UP (ref 1.8–7.4)
NEUTROPHILS NFR BLD AUTO: 69.4 % — SIGNIFICANT CHANGE UP (ref 43–77)
NRBC # FLD: 0 K/UL — SIGNIFICANT CHANGE UP (ref 0–0)
NRBC # FLD: 0 K/UL — SIGNIFICANT CHANGE UP (ref 0–0)
PLATELET # BLD AUTO: 200 K/UL — SIGNIFICANT CHANGE UP (ref 150–400)
PLATELET # BLD AUTO: 202 K/UL — SIGNIFICANT CHANGE UP (ref 150–400)
PMV BLD: 10.1 FL — SIGNIFICANT CHANGE UP (ref 7–13)
PMV BLD: 10.3 FL — SIGNIFICANT CHANGE UP (ref 7–13)
RBC # BLD: 3.96 M/UL — SIGNIFICANT CHANGE UP (ref 3.8–5.2)
RBC # BLD: 4.23 M/UL — SIGNIFICANT CHANGE UP (ref 3.8–5.2)
RBC # FLD: 13.9 % — SIGNIFICANT CHANGE UP (ref 10.3–14.5)
RBC # FLD: 14.2 % — SIGNIFICANT CHANGE UP (ref 10.3–14.5)
RH IG SCN BLD-IMP: POSITIVE — SIGNIFICANT CHANGE UP
RH IG SCN BLD-IMP: POSITIVE — SIGNIFICANT CHANGE UP
SARS-COV-2 RNA SPEC QL NAA+PROBE: SIGNIFICANT CHANGE UP
WBC # BLD: 13.89 K/UL — HIGH (ref 3.8–10.5)
WBC # BLD: 7.82 K/UL — SIGNIFICANT CHANGE UP (ref 3.8–10.5)
WBC # FLD AUTO: 13.89 K/UL — HIGH (ref 3.8–10.5)
WBC # FLD AUTO: 7.82 K/UL — SIGNIFICANT CHANGE UP (ref 3.8–10.5)

## 2020-09-27 RX ORDER — AMPICILLIN TRIHYDRATE 250 MG
2 CAPSULE ORAL ONCE
Refills: 0 | Status: COMPLETED | OUTPATIENT
Start: 2020-09-27 | End: 2020-09-27

## 2020-09-27 RX ORDER — IBUPROFEN 200 MG
600 TABLET ORAL EVERY 6 HOURS
Refills: 0 | Status: COMPLETED | OUTPATIENT
Start: 2020-09-27 | End: 2021-08-26

## 2020-09-27 RX ORDER — SODIUM CHLORIDE 9 MG/ML
1000 INJECTION, SOLUTION INTRAVENOUS
Refills: 0 | Status: DISCONTINUED | OUTPATIENT
Start: 2020-09-27 | End: 2020-09-28

## 2020-09-27 RX ORDER — HEPARIN SODIUM 5000 [USP'U]/ML
5000 INJECTION INTRAVENOUS; SUBCUTANEOUS EVERY 12 HOURS
Refills: 0 | Status: DISCONTINUED | OUTPATIENT
Start: 2020-09-27 | End: 2020-09-30

## 2020-09-27 RX ORDER — MAGNESIUM HYDROXIDE 400 MG/1
30 TABLET, CHEWABLE ORAL
Refills: 0 | Status: DISCONTINUED | OUTPATIENT
Start: 2020-09-27 | End: 2020-09-30

## 2020-09-27 RX ORDER — DEXTROSE 50 % IN WATER 50 %
15 SYRINGE (ML) INTRAVENOUS ONCE
Refills: 0 | Status: DISCONTINUED | OUTPATIENT
Start: 2020-09-27 | End: 2020-09-30

## 2020-09-27 RX ORDER — OXYTOCIN 10 UNIT/ML
333.33 VIAL (ML) INJECTION
Qty: 20 | Refills: 0 | Status: DISCONTINUED | OUTPATIENT
Start: 2020-09-27 | End: 2020-09-28

## 2020-09-27 RX ORDER — FAMOTIDINE 10 MG/ML
20 INJECTION INTRAVENOUS ONCE
Refills: 0 | Status: COMPLETED | OUTPATIENT
Start: 2020-09-27 | End: 2020-09-27

## 2020-09-27 RX ORDER — METOCLOPRAMIDE HCL 10 MG
10 TABLET ORAL ONCE
Refills: 0 | Status: COMPLETED | OUTPATIENT
Start: 2020-09-27 | End: 2020-09-27

## 2020-09-27 RX ORDER — DEXTROSE 50 % IN WATER 50 %
25 SYRINGE (ML) INTRAVENOUS ONCE
Refills: 0 | Status: DISCONTINUED | OUTPATIENT
Start: 2020-09-27 | End: 2020-09-30

## 2020-09-27 RX ORDER — LANOLIN
1 OINTMENT (GRAM) TOPICAL EVERY 6 HOURS
Refills: 0 | Status: DISCONTINUED | OUTPATIENT
Start: 2020-09-27 | End: 2020-09-30

## 2020-09-27 RX ORDER — SODIUM CHLORIDE 9 MG/ML
1000 INJECTION, SOLUTION INTRAVENOUS
Refills: 0 | Status: DISCONTINUED | OUTPATIENT
Start: 2020-09-27 | End: 2020-09-30

## 2020-09-27 RX ORDER — INSULIN LISPRO 100/ML
VIAL (ML) SUBCUTANEOUS AT BEDTIME
Refills: 0 | Status: DISCONTINUED | OUTPATIENT
Start: 2020-09-27 | End: 2020-09-28

## 2020-09-27 RX ORDER — FOLIC ACID 0.8 MG
1 TABLET ORAL DAILY
Refills: 0 | Status: DISCONTINUED | OUTPATIENT
Start: 2020-09-27 | End: 2020-09-30

## 2020-09-27 RX ORDER — TETANUS TOXOID, REDUCED DIPHTHERIA TOXOID AND ACELLULAR PERTUSSIS VACCINE, ADSORBED 5; 2.5; 8; 8; 2.5 [IU]/.5ML; [IU]/.5ML; UG/.5ML; UG/.5ML; UG/.5ML
0.5 SUSPENSION INTRAMUSCULAR ONCE
Refills: 0 | Status: DISCONTINUED | OUTPATIENT
Start: 2020-09-27 | End: 2020-09-30

## 2020-09-27 RX ORDER — CITRIC ACID/SODIUM CITRATE 300-500 MG
30 SOLUTION, ORAL ORAL ONCE
Refills: 0 | Status: COMPLETED | OUTPATIENT
Start: 2020-09-27 | End: 2020-09-27

## 2020-09-27 RX ORDER — INFLUENZA VIRUS VACCINE 15; 15; 15; 15 UG/.5ML; UG/.5ML; UG/.5ML; UG/.5ML
0.5 SUSPENSION INTRAMUSCULAR ONCE
Refills: 0 | Status: COMPLETED | OUTPATIENT
Start: 2020-09-27 | End: 2020-09-28

## 2020-09-27 RX ORDER — INSULIN LISPRO 100/ML
VIAL (ML) SUBCUTANEOUS
Refills: 0 | Status: DISCONTINUED | OUTPATIENT
Start: 2020-09-27 | End: 2020-09-28

## 2020-09-27 RX ORDER — ONDANSETRON 8 MG/1
4 TABLET, FILM COATED ORAL EVERY 6 HOURS
Refills: 0 | Status: DISCONTINUED | OUTPATIENT
Start: 2020-09-28 | End: 2020-09-29

## 2020-09-27 RX ORDER — SIMETHICONE 80 MG/1
80 TABLET, CHEWABLE ORAL EVERY 4 HOURS
Refills: 0 | Status: DISCONTINUED | OUTPATIENT
Start: 2020-09-27 | End: 2020-09-30

## 2020-09-27 RX ORDER — DIPHENHYDRAMINE HCL 50 MG
25 CAPSULE ORAL EVERY 6 HOURS
Refills: 0 | Status: DISCONTINUED | OUTPATIENT
Start: 2020-09-27 | End: 2020-09-30

## 2020-09-27 RX ORDER — DEXTROSE 50 % IN WATER 50 %
12.5 SYRINGE (ML) INTRAVENOUS ONCE
Refills: 0 | Status: DISCONTINUED | OUTPATIENT
Start: 2020-09-27 | End: 2020-09-30

## 2020-09-27 RX ORDER — KETOROLAC TROMETHAMINE 30 MG/ML
30 SYRINGE (ML) INJECTION EVERY 6 HOURS
Refills: 0 | Status: DISCONTINUED | OUTPATIENT
Start: 2020-09-27 | End: 2020-09-28

## 2020-09-27 RX ORDER — ACETAMINOPHEN 500 MG
975 TABLET ORAL
Refills: 0 | Status: DISCONTINUED | OUTPATIENT
Start: 2020-09-27 | End: 2020-09-30

## 2020-09-27 RX ORDER — OXYTOCIN 10 UNIT/ML
333.33 VIAL (ML) INJECTION
Qty: 20 | Refills: 0 | Status: DISCONTINUED | OUTPATIENT
Start: 2020-09-27 | End: 2020-09-27

## 2020-09-27 RX ORDER — METRONIDAZOLE 500 MG
1 TABLET ORAL
Qty: 0 | Refills: 0 | DISCHARGE

## 2020-09-27 RX ORDER — OXYCODONE HYDROCHLORIDE 5 MG/1
5 TABLET ORAL
Refills: 0 | Status: DISCONTINUED | OUTPATIENT
Start: 2020-09-27 | End: 2020-09-30

## 2020-09-27 RX ORDER — GLUCAGON INJECTION, SOLUTION 0.5 MG/.1ML
1 INJECTION, SOLUTION SUBCUTANEOUS ONCE
Refills: 0 | Status: DISCONTINUED | OUTPATIENT
Start: 2020-09-27 | End: 2020-09-30

## 2020-09-27 RX ORDER — SODIUM CHLORIDE 9 MG/ML
1000 INJECTION, SOLUTION INTRAVENOUS ONCE
Refills: 0 | Status: COMPLETED | OUTPATIENT
Start: 2020-09-27 | End: 2020-09-27

## 2020-09-27 RX ORDER — HYDROMORPHONE HYDROCHLORIDE 2 MG/ML
1 INJECTION INTRAMUSCULAR; INTRAVENOUS; SUBCUTANEOUS
Refills: 0 | Status: DISCONTINUED | OUTPATIENT
Start: 2020-09-27 | End: 2020-09-28

## 2020-09-27 RX ORDER — OXYCODONE HYDROCHLORIDE 5 MG/1
5 TABLET ORAL ONCE
Refills: 0 | Status: DISCONTINUED | OUTPATIENT
Start: 2020-09-27 | End: 2020-09-30

## 2020-09-27 RX ORDER — AMPICILLIN TRIHYDRATE 250 MG
1 CAPSULE ORAL EVERY 4 HOURS
Refills: 0 | Status: DISCONTINUED | OUTPATIENT
Start: 2020-09-27 | End: 2020-09-27

## 2020-09-27 RX ADMIN — Medication 30 MILLIGRAM(S): at 19:10

## 2020-09-27 RX ADMIN — Medication 975 MILLIGRAM(S): at 16:02

## 2020-09-27 RX ADMIN — Medication 216 GRAM(S): at 08:31

## 2020-09-27 RX ADMIN — HEPARIN SODIUM 5000 UNIT(S): 5000 INJECTION INTRAVENOUS; SUBCUTANEOUS at 18:35

## 2020-09-27 RX ADMIN — SODIUM CHLORIDE 75 MILLILITER(S): 9 INJECTION, SOLUTION INTRAVENOUS at 12:46

## 2020-09-27 RX ADMIN — Medication 1000 MILLIUNIT(S)/MIN: at 14:12

## 2020-09-27 RX ADMIN — Medication 30 MILLIGRAM(S): at 18:36

## 2020-09-27 RX ADMIN — FAMOTIDINE 20 MILLIGRAM(S): 10 INJECTION INTRAVENOUS at 08:59

## 2020-09-27 RX ADMIN — SODIUM CHLORIDE 2000 MILLILITER(S): 9 INJECTION, SOLUTION INTRAVENOUS at 08:43

## 2020-09-27 RX ADMIN — Medication 10 MILLIGRAM(S): at 08:59

## 2020-09-27 RX ADMIN — Medication 975 MILLIGRAM(S): at 17:49

## 2020-09-27 RX ADMIN — Medication 30 MILLILITER(S): at 08:59

## 2020-09-27 RX ADMIN — Medication 0.2 MILLIGRAM(S): at 18:36

## 2020-09-27 RX ADMIN — Medication 1 TABLET(S): at 23:09

## 2020-09-27 RX ADMIN — Medication 1 MILLIGRAM(S): at 23:09

## 2020-09-27 NOTE — OB RN DELIVERY SUMMARY - NS_SEPSISRSKCALC_OBGYN_ALL_OB_FT
EOS calculated successfully. EOS Risk Factor: 0.5/1000 live births ([facility] national incidence); GA=37w4d; Temp=99; ROM=4.267; GBS='Positive'; Antibiotics='No antibiotics or any antibiotics < 2 hrs prior to birth'

## 2020-09-27 NOTE — DISCHARGE NOTE OB - MEDICATION SUMMARY - MEDICATIONS TO TAKE
I will START or STAY ON the medications listed below when I get home from the hospital:    acetaminophen 325 mg oral tablet  -- 3 tab(s) by mouth every 6 hours, As Needed  -- Indication: For pain    ibuprofen 600 mg oral tablet  -- 1 tab(s) by mouth every 8 hours, As Needed  -- Indication: For pain    lanolin topical ointment  -- 1 application on skin every 6 hours, As needed, Sore Nipples  -- Indication: For Breastfeeding

## 2020-09-27 NOTE — PROVIDER CONTACT NOTE (OTHER) - ACTION/TREATMENT ORDERED:
notified Scott Caballero MD. no action required. will next check F/S @ 0300. Endo will evaluate in the morning

## 2020-09-27 NOTE — OB PROVIDER DELIVERY SUMMARY - NSPROVIDERDELIVERYNOTE_OBGYN_ALL_OB_FT
Procedure: pLTCS  Preop Dx: (1) IUP @ 37w4d (2) breech presentation (3) rupture of membrane     EBL:  1220ml  IVF:  3.4L crystalloids   ml  Findings: endometriosis, adhesed uterus to posterior intraabdominal structures, infant in breech presentation  Baby: Male, Apgars 7/9,  2910g    Maxine Pagan MD  OBGYN PGY2 Male  delivered in [place] presentation, APGARS 7/9, 2910g via primary  section in the setting of breech presentation with PROM.  head was delivered with some difficulty. Placenta was delivered spontaneously intact with a three vessel cord. Unable to exteriorize the uterus secondary to dense adhesions in the posterior aspect of the uterus. Unable to visualize the ovaries or tubes bilaterally.   The hysterotomy incision was closed in two layers. Walker was placed followed by Interceed. Patient received Methergine IM and rectal Cytotec after the uterus was expressed.  IVF:  3.4L crystalloids  UOP: 275 ml  QBL:  1220ml

## 2020-09-27 NOTE — OB PROVIDER H&P - NS_DILATION_OBGYN_ALL_OB_NU
Alert-The patient is alert, awake and responds to voice. The patient is oriented to time, place, and person. The triage nurse is able to obtain subjective information. 0.5

## 2020-09-27 NOTE — OB PROVIDER TRIAGE NOTE - NSHPPHYSICALEXAM_GEN_ALL_CORE
Gen: A&O x 3; NAD  Vitals: BP-117/74; P-123; T-37.2    Pulm-CTA B/L; no wheezes  Cor-clear S1S2; no murmurs  Abd exam-soft and nontender    NST cat II with 155 baseline and occasioanl variabile; +accels. Irreg ctx's    VE-0.5/70/-3; grossly ruptured with clear fluid    TAS-confirms breech

## 2020-09-27 NOTE — OB RN TRIAGE NOTE - CURRENT PREGNANCY COMPLICATIONS, OB PROFILE
Type 1 Diabetes Type 1 Diabetes/Maternal Positive GBS Maternal Positive GBS/Type 2 pregestational diabetic

## 2020-09-27 NOTE — PROVIDER CONTACT NOTE (OTHER) - SITUATION
prim c/s pt from 9/27 @ 1036. 37.4 weeks. s/p meth, L/ bolus and cytotec for QBL of 626 plus extra 600cc

## 2020-09-27 NOTE — OB RN TRIAGE NOTE - PMH
Endometriosis    Fibroids    Tachycardia  pt seen by cardiology  Type 2 diabetes mellitus  diagnosed in 2005

## 2020-09-27 NOTE — DISCHARGE NOTE OB - CARE PROVIDER_API CALL
Carlee Mortensen)  Obstetrics and Gynecology Obstetrics and Gynocology  [address] [place] [address]  Phone: [phone]  Fax: [phone]  Established Patient  Follow Up Time: 2 weeks

## 2020-09-27 NOTE — OB PROVIDER H&P - NSHPPHYSICALEXAM_GEN_ALL_CORE
Gen: A&O x 3; NAD  Vitals: BP-117/74; P-123; T-37.2    Pulm-CTA B/L; no wheezes  Cor-clear S1S2; no murmurs  Abd exam-soft and nontender    NST cat II with 155 baseline and occasioanl variabile; +accels. Irreg ctx's    VE-0.5/70/-3; grossly ruptured with clear fluid  TAS-confirms breech Gen: A&O x 3; NAD  Vitals: BP-117/74; P-123; T-37.2    Pulm-CTA B/L; no wheezes  Cor-clear S1S2; no murmurs  Abd exam-soft and nontender    NST cat II with 155 baseline and occasional variable; +accels. Irreg ctx's    VE-0.5/70/-3; grossly ruptured with clear fluid  TAS-confirms breech

## 2020-09-27 NOTE — OB PROVIDER TRIAGE NOTE - HISTORY OF PRESENT ILLNESS
31yo female  @ 37.4 wks SLIUP via IVF pre-Gestational Type II Diabetic since the age of 16yo, here complaining of rupture of membranes since 6:30am. Pt was scheduled for an ECV tomorrow. Pt reports GFM and some lower abdominal cramping.    Pmhx-DM Type II since 16 yo           hx SV tachycardia-reports she had a cardiac work-up  Pshx/Hosp-LEEP  Meds-PNV; ASA 81 mg BID; Novalog insulin pump; Vit D; Iron  NKDA  Past Ob-denies  Gyn-hx fibroid; ovarian cyst; endometriosis dx via MRI; cervical polyp tx with LEPP  Soc-denies 31yo female  @ 37.4 wks SLIUP via IVF pre-Gestational Type II Diabetic since the age of 14yo, here complaining of rupture of membranes since 6:30am. Pt was scheduled for an ECV tomorrow. Pt reports GFM and some lower abdominal cramping.    Pmhx-DM Type II since 16 yo           hx SV tachycardia-reports she had a cardiac work-up  Pshx/Hosp-LEEP  Meds-PNV; ASA 81 mg BID; Novalog insulin pump; Vit D; Iron; metformin 1000mg BID  NKDA  Past Ob-denies  Gyn-hx fibroid; ovarian cyst; endometriosis dx via MRI; cervical polyp tx with LEPP  Soc-denies

## 2020-09-27 NOTE — DISCHARGE NOTE OB - MEDICATION SUMMARY - MEDICATIONS TO STOP TAKING
I will STOP taking the medications listed below when I get home from the hospital:    Aspirin Low Dose 81 mg oral tablet, chewable  -- orally once a day    ferrous sulfate 325 mg (65 mg elemental iron) oral tablet  -- orally every other day

## 2020-09-27 NOTE — DISCHARGE NOTE OB - PATIENT PORTAL LINK FT
You can access the FollowMyHealth Patient Portal offered by [facility] by registering at the following website: [email or web address]. By joining [employer]’s FollowMyHealth portal, you will also be able to view your health information using other applications (apps) compatible with our system.

## 2020-09-27 NOTE — OB NEONATOLOGY/PEDIATRICIAN DELIVERY SUMMARY - NSPEDSNEONOTESA_OBGYN_ALL_OB_FT
Peds was called for this 37.4 week gestation baby boy delivery breech presentation, primary , Cat II tracing. Mother is 32 year old , this is IVF pregnancy, B+, unremarkable prenatal labs, GBS positive (Ampicillin x 1 at 0837 on , ineffective treatment), COVID pending. SROM at 0620 on  with clear fluids. Max temp 37.2. EOS : 0.33. Mother is Type 2 Diabetes 9diagnosed at 17 years) on Insulin pump. Mother has history of Endometriosis/Fibroids, History of LEEP procedure for cervical polyp. Mother also has history of tachycardia. Baby emerged with weak spontaneous cry, dried, stimulated, warmed, suctioned mouth and nose with improved cry. Apgar 7/9. Mother wants breastfeeding, wants Hep B vaccine, NO circumcision. Matthew is Dr. Ricky Tobin.

## 2020-09-27 NOTE — CHART NOTE - NSCHARTNOTEFT_GEN_A_CORE
Spoke with endocrine fellow regarding insulin pump settings  Pt uses Novalog in insulin pump    Endocrine initially recommended decreasing 12a-5a basal insulin to 0.12, per pt and RN, pump will not allow for this setting  Endocrine now recommends decreasing to 0.10  Will check FS at 3am      avni pgy3

## 2020-09-27 NOTE — DISCHARGE NOTE OB - CARE PLAN
Principal Discharge DX:	Breech presentation, single or unspecified fetus  Goal:	Wound care  Assessment and plan of treatment:	Follow up in the office in 2 weeks for wound check

## 2020-09-27 NOTE — CHART NOTE - NSCHARTNOTEFT_GEN_A_CORE
31 yo F with T2DM on insulin pump s/p CS delivery today 9/27. Pt has remained on pump through CS. FS post-delivery: 105-->80.    Recommend Postpartum pump settings as follows per recent outpatient OB DM recommendations:    Basal:  12am: 0.15  5am: 1.0  12pm: 0.55    ICR--> 12am-12pm 1:6    Recommend checking FS AC/HS and 1 hr post-prandial.    D/W Team    Formal Consult tomorrow    Sabrina Jennings DO, Endocrine Fellow   Pager [phone]. from 9-5PM. After hours and on weekends please call [phone]. 31 yo F with T2DM on insulin pump s/p CS delivery today 9/27. Pt has remained on pump through CS. FS post-delivery: 105-->80.    Recommend Postpartum pump settings as follows per recent outpatient OB DM recommendations:    Basal:  12am: 0.12  5am: 1.0  12pm: 0.55    ICR--> 12am-12pm 1:6    Recommend checking FS AC/HS    D/W Team    Formal Consult tomorrow    Sabrina Jennings DO, Endocrine Fellow   Pager [phone]. from 9-5PM. After hours and on weekends please call [phone]. 29 yo F with T2DM on insulin pump s/p CS delivery today 9/27. Pt has remained on pump through CS. FS post-delivery: 105-->80.    Recommend Postpartum pump settings as follows:    Basal:  12am-5am 0.12 (currently at 0.15, please ask pt to decrease to 0.12)  5am-12pm: 1.0  12pm-12am: 0.55    ICR--> 12am-12pm 1:6    Recommend checking FS AC/HS and **3 AM FS** as patients are at higher risk for hypoglycemia in immediate post-partum period.    D/W Team    Formal Consult tomorrow    Sabrina Jennings DO, Endocrine Fellow   Pager [phone]. from 9-5PM. After hours and on weekends please call [phone]. 29 yo F with T2DM on insulin pump s/p CS delivery today 9/27. Pt has remained on pump through CS. FS post-delivery: 105-->80.    Recommend Postpartum pump settings as follows:    Basal:  12am-5am 0.12 (currently at 0.15, please ask pt to decrease to 0.12)  5am-12pm: 1.0  12pm-12am: 0.55    ICR - 1:6    Recommend checking FS AC/HS and **3 AM FS** as patients are at higher risk for hypoglycemia in immediate post-partum period.    D/W Team    Formal Consult tomorrow    Sabrina Jennings DO, Endocrine Fellow   Pager [phone]. from 9-5PM. After hours and on weekends please call [phone]. 29 yo F with T2DM on insulin pump s/p CS delivery today 9/27. Pt has remained on pump through CS. FS post-delivery: 105-->80.    Recommend Postpartum pump settings as follows:    Basal:  12am-5am 0.10 (currently at 0.15, please ask pt to decrease to 0.12)  5am-12pm: 1.0  12pm-12am: 0.55    ICR - 1:6    Recommend checking FS AC/HS and **3 AM FS** as patients are at higher risk for hypoglycemia in immediate post-partum period.    D/W Team    Formal Consult tomorrow    Sabrina Jennings DO, Endocrine Fellow   Pager [phone]. from 9-5PM. After hours and on weekends please call [phone].

## 2020-09-27 NOTE — OB PROVIDER H&P - ATTENDING COMMENTS
Patient for primary  section for SROM in the setting of Breech presentation. Patient is GBS positive in urine and receiving Ampicillin IV. Pregnancy via IVF. Complicated by history of Type II diabetes on Metformin and Insulin pump.  Clip and prep  SCDs  IV Antibiotics  Bicitra

## 2020-09-27 NOTE — OB PROVIDER TRIAGE NOTE - NSOBPROVIDERNOTE_OBGYN_ALL_OB_FT
31yo female  @ 37.4 wks SLIUP pre-gestational type II diabetic on insulin pump here with confirmed rupture of membranes since 6:30a with fetus in breech presentation  -pt with hx tachycardia-had work-up in 2018; no follow up needed per pt  -pt confirmed breech  -pt with insulin pump  - huddle called  -Dr Anderson called; aware on Cat II NST. Pt prepped for primary

## 2020-09-27 NOTE — OB PROVIDER H&P - HISTORY OF PRESENT ILLNESS
31yo female  @ 37.4 wks SLIUP via IVF pre-Gestational Type II Diabetic since the age of 16yo, here complaining of rupture of membranes since 6:30am. Pt was scheduled for an ECV tomorrow. Pt reports GFM and some lower abdominal cramping.    Pmhx-DM Type II since 16 yo           hx SV tachycardia-reports she had a cardiac work-up  Pshx/Hosp-LEEP  Meds-PNV; ASA 81 mg BID; Novalog insulin pump; Vit D; Iron; metformin 1000mg BID  NKDA  Past Ob-denies  Gyn-hx fibroid; ovarian cyst; endometriosis dx via MRI; cervical polyp tx with LEPP  Soc-denies

## 2020-09-27 NOTE — OB PROVIDER TRIAGE NOTE - NS_OBGYNHISTORY_OBGYN_ALL_OB_FT
-IVF pregnancy  -hx cervical polyp-tx'ed with LEEP  -endometriosis dx via MRI  -hx fibroids  -hx ovarian cyst

## 2020-09-28 ENCOUNTER — APPOINTMENT (OUTPATIENT)
Dept: MATERNAL FETAL MEDICINE | Facility: CLINIC | Age: 30
End: 2020-09-28

## 2020-09-28 ENCOUNTER — APPOINTMENT (OUTPATIENT)
Dept: ANTEPARTUM | Facility: CLINIC | Age: 30
End: 2020-09-28

## 2020-09-28 ENCOUNTER — APPOINTMENT (OUTPATIENT)
Dept: ANTEPARTUM | Facility: HOSPITAL | Age: 30
End: 2020-09-28

## 2020-09-28 DIAGNOSIS — Z96.41 PRESENCE OF INSULIN PUMP (EXTERNAL) (INTERNAL): ICD-10-CM

## 2020-09-28 DIAGNOSIS — I10 ESSENTIAL (PRIMARY) HYPERTENSION: ICD-10-CM

## 2020-09-28 DIAGNOSIS — E11.9 TYPE 2 DIABETES MELLITUS WITHOUT COMPLICATIONS: ICD-10-CM

## 2020-09-28 PROBLEM — D21.9 BENIGN NEOPLASM OF CONNECTIVE AND OTHER SOFT TISSUE, UNSPECIFIED: Chronic | Status: ACTIVE | Noted: 2020-09-27

## 2020-09-28 PROBLEM — R00.0 TACHYCARDIA, UNSPECIFIED: Chronic | Status: ACTIVE | Noted: 2020-09-27

## 2020-09-28 LAB
BASOPHILS # BLD AUTO: 0.02 K/UL — SIGNIFICANT CHANGE UP (ref 0–0.2)
BASOPHILS NFR BLD AUTO: 0.2 % — SIGNIFICANT CHANGE UP (ref 0–2)
EOSINOPHIL # BLD AUTO: 0.05 K/UL — SIGNIFICANT CHANGE UP (ref 0–0.5)
EOSINOPHIL NFR BLD AUTO: 0.5 % — SIGNIFICANT CHANGE UP (ref 0–6)
GLUCOSE BLDC GLUCOMTR-MCNC: 139 MG/DL — HIGH (ref 70–99)
GLUCOSE BLDC GLUCOMTR-MCNC: 153 MG/DL — HIGH (ref 70–99)
GLUCOSE BLDC GLUCOMTR-MCNC: 178 MG/DL — HIGH (ref 70–99)
GLUCOSE BLDC GLUCOMTR-MCNC: 85 MG/DL — SIGNIFICANT CHANGE UP (ref 70–99)
GLUCOSE BLDC GLUCOMTR-MCNC: 90 MG/DL — SIGNIFICANT CHANGE UP (ref 70–99)
HBA1C BLD-MCNC: 4.8 % — SIGNIFICANT CHANGE UP (ref 4–5.6)
HCT VFR BLD CALC: 31.3 % — LOW (ref 34.5–45)
HGB BLD-MCNC: 9.9 G/DL — LOW (ref 11.5–15.5)
IMM GRANULOCYTES NFR BLD AUTO: 0.3 % — SIGNIFICANT CHANGE UP (ref 0–1.5)
LYMPHOCYTES # BLD AUTO: 1.99 K/UL — SIGNIFICANT CHANGE UP (ref 1–3.3)
LYMPHOCYTES # BLD AUTO: 19 % — SIGNIFICANT CHANGE UP (ref 13–44)
MCHC RBC-ENTMCNC: 28.2 PG — SIGNIFICANT CHANGE UP (ref 27–34)
MCHC RBC-ENTMCNC: 31.6 % — LOW (ref 32–36)
MCV RBC AUTO: 89.2 FL — SIGNIFICANT CHANGE UP (ref 80–100)
MONOCYTES # BLD AUTO: 0.69 K/UL — SIGNIFICANT CHANGE UP (ref 0–0.9)
MONOCYTES NFR BLD AUTO: 6.6 % — SIGNIFICANT CHANGE UP (ref 2–14)
NEUTROPHILS # BLD AUTO: 7.71 K/UL — HIGH (ref 1.8–7.4)
NEUTROPHILS NFR BLD AUTO: 73.4 % — SIGNIFICANT CHANGE UP (ref 43–77)
NRBC # FLD: 0 K/UL — SIGNIFICANT CHANGE UP (ref 0–0)
PLATELET # BLD AUTO: 175 K/UL — SIGNIFICANT CHANGE UP (ref 150–400)
PMV BLD: 10.8 FL — SIGNIFICANT CHANGE UP (ref 7–13)
RBC # BLD: 3.51 M/UL — LOW (ref 3.8–5.2)
RBC # FLD: 14 % — SIGNIFICANT CHANGE UP (ref 10.3–14.5)
T PALLIDUM AB TITR SER: NEGATIVE — SIGNIFICANT CHANGE UP
WBC # BLD: 10.49 K/UL — SIGNIFICANT CHANGE UP (ref 3.8–10.5)
WBC # FLD AUTO: 10.49 K/UL — SIGNIFICANT CHANGE UP (ref 3.8–10.5)

## 2020-09-28 PROCEDURE — 99254 IP/OBS CNSLTJ NEW/EST MOD 60: CPT

## 2020-09-28 RX ORDER — IBUPROFEN 200 MG
600 TABLET ORAL EVERY 6 HOURS
Refills: 0 | Status: DISCONTINUED | OUTPATIENT
Start: 2020-09-28 | End: 2020-09-30

## 2020-09-28 RX ORDER — INSULIN ASPART 100 [IU]/ML
1 INJECTION, SOLUTION SUBCUTANEOUS
Refills: 0 | Status: DISCONTINUED | OUTPATIENT
Start: 2020-09-28 | End: 2020-09-30

## 2020-09-28 RX ORDER — OXYCODONE HYDROCHLORIDE 5 MG/1
5 TABLET ORAL
Refills: 0 | Status: DISCONTINUED | OUTPATIENT
Start: 2020-09-28 | End: 2020-09-28

## 2020-09-28 RX ORDER — OXYCODONE HYDROCHLORIDE 5 MG/1
10 TABLET ORAL
Refills: 0 | Status: DISCONTINUED | OUTPATIENT
Start: 2020-09-28 | End: 2020-09-28

## 2020-09-28 RX ORDER — NALOXONE HYDROCHLORIDE 4 MG/.1ML
0.1 SPRAY NASAL
Refills: 0 | Status: DISCONTINUED | OUTPATIENT
Start: 2020-09-28 | End: 2020-09-28

## 2020-09-28 RX ORDER — BUTORPHANOL TARTRATE 2 MG/ML
0.12 INJECTION, SOLUTION INTRAMUSCULAR; INTRAVENOUS EVERY 6 HOURS
Refills: 0 | Status: DISCONTINUED | OUTPATIENT
Start: 2020-09-28 | End: 2020-09-28

## 2020-09-28 RX ADMIN — Medication 0.2 MILLIGRAM(S): at 11:10

## 2020-09-28 RX ADMIN — Medication 600 MILLIGRAM(S): at 23:21

## 2020-09-28 RX ADMIN — Medication 975 MILLIGRAM(S): at 15:00

## 2020-09-28 RX ADMIN — Medication 975 MILLIGRAM(S): at 08:26

## 2020-09-28 RX ADMIN — Medication 975 MILLIGRAM(S): at 14:10

## 2020-09-28 RX ADMIN — Medication 600 MILLIGRAM(S): at 17:09

## 2020-09-28 RX ADMIN — Medication 600 MILLIGRAM(S): at 18:00

## 2020-09-28 RX ADMIN — Medication 30 MILLIGRAM(S): at 11:41

## 2020-09-28 RX ADMIN — Medication 1 MILLIGRAM(S): at 11:10

## 2020-09-28 RX ADMIN — Medication 30 MILLIGRAM(S): at 11:11

## 2020-09-28 RX ADMIN — Medication 30 MILLIGRAM(S): at 05:59

## 2020-09-28 RX ADMIN — INFLUENZA VIRUS VACCINE 0.5 MILLILITER(S): 15; 15; 15; 15 SUSPENSION INTRAMUSCULAR at 05:30

## 2020-09-28 RX ADMIN — Medication 30 MILLIGRAM(S): at 06:30

## 2020-09-28 RX ADMIN — Medication 975 MILLIGRAM(S): at 08:57

## 2020-09-28 RX ADMIN — Medication 975 MILLIGRAM(S): at 03:15

## 2020-09-28 RX ADMIN — Medication 0.2 MILLIGRAM(S): at 05:35

## 2020-09-28 RX ADMIN — Medication 600 MILLIGRAM(S): at 23:51

## 2020-09-28 RX ADMIN — Medication 0.2 MILLIGRAM(S): at 00:07

## 2020-09-28 RX ADMIN — Medication 30 MILLIGRAM(S): at 00:07

## 2020-09-28 RX ADMIN — Medication 975 MILLIGRAM(S): at 02:43

## 2020-09-28 RX ADMIN — HEPARIN SODIUM 5000 UNIT(S): 5000 INJECTION INTRAVENOUS; SUBCUTANEOUS at 17:09

## 2020-09-28 RX ADMIN — MAGNESIUM HYDROXIDE 30 MILLILITER(S): 400 TABLET, CHEWABLE ORAL at 11:10

## 2020-09-28 RX ADMIN — Medication 30 MILLIGRAM(S): at 00:36

## 2020-09-28 RX ADMIN — HEPARIN SODIUM 5000 UNIT(S): 5000 INJECTION INTRAVENOUS; SUBCUTANEOUS at 05:34

## 2020-09-28 NOTE — PROVIDER CONTACT NOTE (OTHER) - ASSESSMENT
pt is heavily bleeding on pads. changed pad 4x in 5 hours. pt used bathroom and passed fist sized clots. fundus is firm. pt is asymptomatic.

## 2020-09-28 NOTE — CONSULT NOTE ADULT - ASSESSMENT
30 yr old F with Type 2 DM on Omnipod insulin pump s/p C section 9/27. Transtioned to preprogrameed post partum settings. Patient has decreased appetite. Plans on breast feeding. 30 yr old F with Type 2 DM on Omnipod insulin pump s/p C section 9/27. Transitioned to preprogrammed post partum settings. Patient has decreased appetite. Plans on breast feeding.

## 2020-09-28 NOTE — LACTATION INITIAL EVALUATION - LACTATION INTERVENTIONS
assisted with deep latch and positioning discussed  signs  of  effective  feeding and  swallowing.  instructed  to offer both  breast at a feeding ,feed on cue and safe  skin to skin.  reviewed risks of  formula feeding .   reviewed with  mother breastfeeding section  of  postpartum  book./initiate hand expression routine/initiate skin to skin

## 2020-09-28 NOTE — LACTATION INITIAL EVALUATION - INTERVENTION OUTCOME
verbalizes understanding/nbn demonstrated  deep latch and  performed  with sucking and swallowing  noted . encouraged  more frequent attempts  at breast and  less formula.   offered assistance as needed.

## 2020-09-28 NOTE — PROGRESS NOTE ADULT - ASSESSMENT
A: Patient is a 30y yo POD#1 s/p pLTCS for breech presentation, feeling well and meeting appropriate postoperative milestones.     Plan:   - dressing removed today  - continue current pain regimen  - continue regular diet  - increase ambulation     ENEDINA Gutierrez, PGY1

## 2020-09-28 NOTE — CONSULT NOTE ADULT - SUBJECTIVE AND OBJECTIVE BOX
HPI:  29yo female  @ 37.4 wks SLIUP via IVF pre-Gestational Type II Diabetic since the age of 16yo, here complaining of rupture of membranes since 6:30am. Pt was scheduled for an ECV tomorrow. Pt reports GFM and some lower abdominal cramping.    Endocrine History:  30 yr old F with Type 2 DM since age of 15 on Omnipod insulin pump s/p C section . Patient was followign with endocrinologuist Dr Amairani velazquez maternal fetal medicien through her pregancey. No known complcaiotns. Her DM was well controlled prior to and durign hte pregancy. She was started on insulin pump in 2020 at the beginning of her pregancy. She has been moderitn her intake of carvs. Uses bolus wizard on her pump. Uses DEXCOM for CGM. Has never been hsotpaizled for DKA. Bothkeny castro and John have Tyep 2 DM. Remaiend on isnulin pump through her delivery and nwo has been trasntioned to post partum settign programmed by MFM. She changes her site very 3 days.     Has the folowign settigns on her pump:  12a-5a 0.1 (lowered by endo team from 0.15)  5a-12p 1.0  12p-12a 0.55  I:C 1:6  ISF 1:40        PAST MEDICAL & SURGICAL HISTORY:  Tachycardia  pt seen by cardiology    Fibroids    Endometriosis    Type 2 diabetes mellitus  diagnosed in     H/O LEEP        FAMILY HISTORY: Motehr and Father: DM2      Social History: nonsmoker, nondrinker    Outpatient Medications: Novolog (insulin pump)    MEDICATIONS  (STANDING):  acetaminophen   Tablet .. 975 milliGRAM(s) Oral <User Schedule>  dextrose 5%. 1000 milliLiter(s) (50 mL/Hr) IV Continuous <Continuous>  dextrose 50% Injectable 12.5 Gram(s) IV Push once  dextrose 50% Injectable 25 Gram(s) IV Push once  dextrose 50% Injectable 25 Gram(s) IV Push once  diphtheria/tetanus/pertussis (acellular) Vaccine (ADAcel) 0.5 milliLiter(s) IntraMuscular once  folic acid 1 milliGRAM(s) Oral daily  heparin   Injectable 5000 Unit(s) SubCutaneous every 12 hours  ibuprofen  Tablet. 600 milliGRAM(s) Oral every 6 hours  insulin aspart (NovoLOG) Pump 1 Each SubCutaneous Continuous Pump  insulin lispro (HumaLOG) corrective regimen sliding scale   SubCutaneous three times a day before meals  insulin lispro (HumaLOG) corrective regimen sliding scale   SubCutaneous at bedtime  ketorolac   Injectable 30 milliGRAM(s) IV Push every 6 hours  prenatal multivitamin 1 Tablet(s) Oral daily    MEDICATIONS  (PRN):  butorphanol Injectable 0.125 milliGRAM(s) IV Push every 6 hours PRN Pruritus  dextrose 40% Gel 15 Gram(s) Oral once PRN Blood Glucose LESS THAN 70 milliGRAM(s)/deciliter  diphenhydrAMINE 25 milliGRAM(s) Oral every 6 hours PRN Itching  glucagon  Injectable 1 milliGRAM(s) IntraMuscular once PRN Glucose LESS THAN 70 milligrams/deciliter  HYDROmorphone  Injectable 1 milliGRAM(s) IV Push every 3 hours PRN Severe Pain (7 - 10)  lanolin Ointment 1 Application(s) Topical every 6 hours PRN Sore Nipples  magnesium hydroxide Suspension 30 milliLiter(s) Oral two times a day PRN Constipation  naloxone Injectable 0.1 milliGRAM(s) IV Push every 3 minutes PRN For ANY of the following changes in patient status:  A. RR LESS THAN 10 breaths per minute, B. Oxygen saturation LESS THAN 90%, C. Sedation score of 6  ondansetron Injectable 4 milliGRAM(s) IV Push every 6 hours PRN Nausea  oxyCODONE    IR 5 milliGRAM(s) Oral every 3 hours PRN Moderate to Severe Pain (4-10)  oxyCODONE    IR 5 milliGRAM(s) Oral once PRN Moderate to Severe Pain (4-10)  oxyCODONE    IR 5 milliGRAM(s) Oral every 3 hours PRN Mild Pain (1 - 3)  oxyCODONE    IR 10 milliGRAM(s) Oral every 3 hours PRN Moderate Pain (4 - 6)  simethicone 80 milliGRAM(s) Chew every 4 hours PRN Gas      Allergies    No Known Allergies    Intolerances      Review of Systems:  Constitutional: No fever  Eyes: No blurry vision  Neuro: No tremors  HEENT: No pain  Cardiovascular: No chest pain, palpitations  Respiratory: No SOB, no cough  GI: No nausea, vomiting, abdominal pain  : No dysuria  Skin: no rash  Psych: no depression  Endocrine: no polyuria, polydipsia      ALL OTHER SYSTEMS REVIEWED AND NEGATIVE        PHYSICAL EXAM:  VITALS: T(C): 37 (20 @ 05:00)  T(F): 98.6 (20 @ 05:00), Max: 98.6 (20 @ 05:00)  HR: 100 (20 @ 05:00) (82 - 103)  BP: 100/57 (20 @ 05:00) (78/62 - 125/87)  RR:  (15 - 25)  SpO2:  (97% - 100%)  Wt(kg): --  GENERAL: NAD, well-groomed, well-developed  EYES: No proptosis, no lid lag, anicteric  HEENT:  Atraumatic, Normocephalic, moist mucous membranes  THYROID: Normal size, no palpable nodules  RESPIRATORY: Clear to auscultation bilaterally; No rales, rhonchi, wheezing, or rubs  CARDIOVASCULAR: Regular rate and rhythm  GI: Soft, mildly tender  SKIN: surgical scar on abdomen, Insulin pump R thigh  PSYCH: Alert and oriented x 3, normal affect, normal mood      POCT Blood Glucose.: 90 mg/dL (20 @ 05:40)  POCT Blood Glucose.: 85 mg/dL (20 @ 03:04)  POCT Blood Glucose.: 171 mg/dL (20 @ 23:08)  POCT Blood Glucose.: 80 mg/dL (20 @ 17:30)  POCT Blood Glucose.: 105 mg/dL (20 @ 17:08)  POCT Blood Glucose.: 110 mg/dL (20 @ 08:19)                            9.9    10.49 )-----------( 175      ( 28 Sep 2020 05:40 )             31.3                                        HPI:  31yo female  @ 37.4 wks SLIUP via IVF pre-Gestational Type II Diabetic since the age of 14yo, here complaining of rupture of membranes since 6:30am. Pt was scheduled for an ECV tomorrow. Pt reports GFM and some lower abdominal cramping.    Endocrine History:  30 yr old F with Type 2 DM since age of 15 on Omnipod insulin pump s/p C section . Patient was following with endocrinologist Dr Bales and maternal fetal medicine through her pregnancy. No known complications. Her DM was well controlled prior to and during  pregnancy. She was started on insulin pump in 2020 at the beginning of her pregnancy. She has been moderating her intake of carbs. Uses bolus wizard on her pump. Uses DEXCOM for CGM. Has never been hospitalized for DKA. Both mother and Father have Type 2 DM. Remained on insulin pump through her delivery and now has been transitioned to post partum settings programmed by M. She changes her site every 3 days.     Has the following settings on her pump:  Basal:  12a-5a 0.1 (lowered by endo team from 0.15)  5a-12p 1.0  12p-12a 0.55  I:C 1:6  ISF 1:40        PAST MEDICAL & SURGICAL HISTORY:  Tachycardia  pt seen by cardiology    Fibroids    Endometriosis    Type 2 diabetes mellitus  diagnosed in     H/O AMINTA        FAMILY HISTORY: Mother and Father: DM2      Social History: nonsmoker, nondrinker    Outpatient Medications: Novolog (insulin pump)    MEDICATIONS  (STANDING):  acetaminophen   Tablet .. 975 milliGRAM(s) Oral <User Schedule>  dextrose 5%. 1000 milliLiter(s) (50 mL/Hr) IV Continuous <Continuous>  dextrose 50% Injectable 12.5 Gram(s) IV Push once  dextrose 50% Injectable 25 Gram(s) IV Push once  dextrose 50% Injectable 25 Gram(s) IV Push once  diphtheria/tetanus/pertussis (acellular) Vaccine (ADAcel) 0.5 milliLiter(s) IntraMuscular once  folic acid 1 milliGRAM(s) Oral daily  heparin   Injectable 5000 Unit(s) SubCutaneous every 12 hours  ibuprofen  Tablet. 600 milliGRAM(s) Oral every 6 hours  insulin aspart (NovoLOG) Pump 1 Each SubCutaneous Continuous Pump  insulin lispro (HumaLOG) corrective regimen sliding scale   SubCutaneous three times a day before meals  insulin lispro (HumaLOG) corrective regimen sliding scale   SubCutaneous at bedtime  ketorolac   Injectable 30 milliGRAM(s) IV Push every 6 hours  prenatal multivitamin 1 Tablet(s) Oral daily    MEDICATIONS  (PRN):  butorphanol Injectable 0.125 milliGRAM(s) IV Push every 6 hours PRN Pruritus  dextrose 40% Gel 15 Gram(s) Oral once PRN Blood Glucose LESS THAN 70 milliGRAM(s)/deciliter  diphenhydrAMINE 25 milliGRAM(s) Oral every 6 hours PRN Itching  glucagon  Injectable 1 milliGRAM(s) IntraMuscular once PRN Glucose LESS THAN 70 milligrams/deciliter  HYDROmorphone  Injectable 1 milliGRAM(s) IV Push every 3 hours PRN Severe Pain (7 - 10)  lanolin Ointment 1 Application(s) Topical every 6 hours PRN Sore Nipples  magnesium hydroxide Suspension 30 milliLiter(s) Oral two times a day PRN Constipation  naloxone Injectable 0.1 milliGRAM(s) IV Push every 3 minutes PRN For ANY of the following changes in patient status:  A. RR LESS THAN 10 breaths per minute, B. Oxygen saturation LESS THAN 90%, C. Sedation score of 6  ondansetron Injectable 4 milliGRAM(s) IV Push every 6 hours PRN Nausea  oxyCODONE    IR 5 milliGRAM(s) Oral every 3 hours PRN Moderate to Severe Pain (4-10)  oxyCODONE    IR 5 milliGRAM(s) Oral once PRN Moderate to Severe Pain (4-10)  oxyCODONE    IR 5 milliGRAM(s) Oral every 3 hours PRN Mild Pain (1 - 3)  oxyCODONE    IR 10 milliGRAM(s) Oral every 3 hours PRN Moderate Pain (4 - 6)  simethicone 80 milliGRAM(s) Chew every 4 hours PRN Gas      Allergies    No Known Allergies    Intolerances      Review of Systems:  Constitutional: No fever  Eyes: No blurry vision  Neuro: No tremors  HEENT: No pain  Cardiovascular: No chest pain, palpitations  Respiratory: No SOB, no cough  GI: No nausea, vomiting, abdominal pain  : No dysuria  Skin: no rash  Psych: no depression  Endocrine: no polyuria, polydipsia      ALL OTHER SYSTEMS REVIEWED AND NEGATIVE        PHYSICAL EXAM:  VITALS: T(C): 37 (20 @ 05:00)  T(F): 98.6 (20 @ 05:00), Max: 98.6 (20 @ 05:00)  HR: 100 (20 @ 05:00) (82 - 103)  BP: 100/57 (20 @ 05:00) (78/62 - 125/87)  RR:  (15 - 25)  SpO2:  (97% - 100%)  Wt(kg): --  GENERAL: NAD, well-groomed, well-developed  EYES: No proptosis, no lid lag, anicteric  HEENT:  Atraumatic, Normocephalic, moist mucous membranes  THYROID: Normal size, no palpable nodules  RESPIRATORY: Clear to auscultation bilaterally; No rales, rhonchi, wheezing, or rubs  CARDIOVASCULAR: Regular rate and rhythm  GI: Soft, mildly tender  SKIN: surgical scar on abdomen, Insulin pump R thigh  PSYCH: Alert and oriented x 3, normal affect, normal mood      POCT Blood Glucose.: 90 mg/dL (20 @ 05:40)  POCT Blood Glucose.: 85 mg/dL (20 @ 03:04)  POCT Blood Glucose.: 171 mg/dL (20 @ 23:08)  POCT Blood Glucose.: 80 mg/dL (20 @ 17:30)  POCT Blood Glucose.: 105 mg/dL (20 @ 17:08)  POCT Blood Glucose.: 110 mg/dL (20 @ 08:19)                            9.9    10.49 )-----------( 175      ( 28 Sep 2020 05:40 )             31.3

## 2020-09-28 NOTE — PROVIDER CONTACT NOTE (OTHER) - ACTION/TREATMENT ORDERED:
Sania Louis NP notified. will recheck sanitary vazquez @ 0300. pt instructed to call if passing more clots. will continue to monitor

## 2020-09-28 NOTE — CONSULT NOTE ADULT - ATTENDING COMMENTS
Carolann Serrato (pager [phone])  On evenings and weekends, please call [phone] or page endocrine fellow on call.   Please note that this patient may be followed by different provider tomorrow. If no answer, contact endocrine fellow on call.

## 2020-09-28 NOTE — PROGRESS NOTE ADULT - SUBJECTIVE AND OBJECTIVE BOX
Postop Day  1  s/p   C- Section    THERAPY:  [ X] Spinal morphine         Sedation Score:	  [ X] Alert	    [  ] Drowsy        [  ] Arousable	[  ] Asleep	[  ] Unresponsive    Side Effects:	  [ X] None	     [  ] Nausea        [  ] Pruritus        [  ] Weakness   [  ] Numbness        ASSESSMENT/ PLAN   Change to po prn pain meds 24 hours post Spinal Morphine.  [ x ]Documentation and Verification of current medications

## 2020-09-28 NOTE — CONSULT NOTE ADULT - PROBLEM SELECTOR RECOMMENDATION 3
BP goal <130/80  Currently contrpled off medication BP goal <130/80  Currently controlled off medication

## 2020-09-28 NOTE — CONSULT NOTE ADULT - PROBLEM SELECTOR RECOMMENDATION 2
Patient must complete insulin pump forms  Carb intkae and boluses must be documented in nursing flow sheet  Last site change was on 9/27  Has supplies Patient must complete insulin pump forms  Carb intake and boluses must be documented in nursing flow sheet  Last site change was on 9/27  Has supplies

## 2020-09-28 NOTE — PROVIDER CONTACT NOTE (OTHER) - SITUATION
pt s/p meth, L/ bolus and cytotec for QBL of 626 plus extra 600cc. pt currently on Methergine series

## 2020-09-28 NOTE — PROGRESS NOTE ADULT - SUBJECTIVE AND OBJECTIVE BOX
R1 Progress Note    Patient is a 30y yo POD#1 s/p pLTCS for breech presentation, seen and examined at bedside. No acute overnight events. No acute complaints, pain well controlled. Patient is ambulating and tolerating regular diet, voiding spontaneously, not yet passing flatus. Denies CP, SOB, N/V, HA, blurred vision, epigastric pain.    Vital Signs Last 24 Hours  T(C): 37 (09-28-20 @ 05:00), Max: 37.2 (09-27-20 @ 07:56)  HR: 100 (09-28-20 @ 05:00) (82 - 127)  BP: 100/57 (09-28-20 @ 05:00) (78/62 - 125/87)  RR: 19 (09-28-20 @ 05:00) (15 - 25)  SpO2: 99% (09-28-20 @ 05:00) (97% - 100%)    I&O's Summary    27 Sep 2020 07:01  -  28 Sep 2020 07:00  --------------------------------------------------------  IN: 5325 mL / OUT: 4370 mL / NET: 955 mL        Physical Exam:  General: NAD  Abdomen: Soft, appropriately tender, non-distended, fundus firm  Incision: Dressing removed today, Pfannenstiel incision CDI, subcuticular suture closure, steris in place  Pelvic: Lochia wnl    Labs:    Blood Type: B Positive  Antibody Screen: Negative  RPR: Negative               9.9    10.49 )-----------( 175      ( 09-28 @ 05:40 )             31.3                11.1   13.89 )-----------( 202      ( 09-27 @ 16:25 )             33.7                11.8   7.82  )-----------( 200      ( 09-27 @ 08:35 )             36.3         MEDICATIONS  (STANDING):  acetaminophen   Tablet .. 975 milliGRAM(s) Oral <User Schedule>  dextrose 5%. 1000 milliLiter(s) (50 mL/Hr) IV Continuous <Continuous>  dextrose 50% Injectable 12.5 Gram(s) IV Push once  dextrose 50% Injectable 25 Gram(s) IV Push once  dextrose 50% Injectable 25 Gram(s) IV Push once  diphtheria/tetanus/pertussis (acellular) Vaccine (ADAcel) 0.5 milliLiter(s) IntraMuscular once  folic acid 1 milliGRAM(s) Oral daily  heparin   Injectable 5000 Unit(s) SubCutaneous every 12 hours  ibuprofen  Tablet. 600 milliGRAM(s) Oral every 6 hours  insulin aspart (NovoLOG) Pump 1 Each SubCutaneous Continuous Pump  insulin lispro (HumaLOG) corrective regimen sliding scale   SubCutaneous three times a day before meals  insulin lispro (HumaLOG) corrective regimen sliding scale   SubCutaneous at bedtime  ketorolac   Injectable 30 milliGRAM(s) IV Push every 6 hours  methylergonovine 0.2 milliGRAM(s) Oral every 6 hours  prenatal multivitamin 1 Tablet(s) Oral daily    MEDICATIONS  (PRN):  butorphanol Injectable 0.125 milliGRAM(s) IV Push every 6 hours PRN Pruritus  dextrose 40% Gel 15 Gram(s) Oral once PRN Blood Glucose LESS THAN 70 milliGRAM(s)/deciliter  diphenhydrAMINE 25 milliGRAM(s) Oral every 6 hours PRN Itching  glucagon  Injectable 1 milliGRAM(s) IntraMuscular once PRN Glucose LESS THAN 70 milligrams/deciliter  HYDROmorphone  Injectable 1 milliGRAM(s) IV Push every 3 hours PRN Severe Pain (7 - 10)  lanolin Ointment 1 Application(s) Topical every 6 hours PRN Sore Nipples  magnesium hydroxide Suspension 30 milliLiter(s) Oral two times a day PRN Constipation  naloxone Injectable 0.1 milliGRAM(s) IV Push every 3 minutes PRN For ANY of the following changes in patient status:  A. RR LESS THAN 10 breaths per minute, B. Oxygen saturation LESS THAN 90%, C. Sedation score of 6  ondansetron Injectable 4 milliGRAM(s) IV Push every 6 hours PRN Nausea  oxyCODONE    IR 5 milliGRAM(s) Oral every 3 hours PRN Moderate to Severe Pain (4-10)  oxyCODONE    IR 5 milliGRAM(s) Oral once PRN Moderate to Severe Pain (4-10)  oxyCODONE    IR 5 milliGRAM(s) Oral every 3 hours PRN Mild Pain (1 - 3)  oxyCODONE    IR 10 milliGRAM(s) Oral every 3 hours PRN Moderate Pain (4 - 6)  simethicone 80 milliGRAM(s) Chew every 4 hours PRN Gas

## 2020-09-28 NOTE — CONSULT NOTE ADULT - PROBLEM SELECTOR RECOMMENDATION 9
-While inpatietn CHO diet and FS AC and HS  -Goal Glucose 100-180  -Continue current settings on insulin  -Endocrine team will follow and make adjustments if needed  -Patient will be discharged on insulin pump and has oupt appt within 1 week with Dr Bales  -Emphasized iporatnce of optho folow up after pregancy for retinopathy screening -While inpatient CHO diet and FS AC and HS  -Goal Glucose 100-180  -Continue current settings on insulin  -Endocrine team will follow and make adjustments if needed  -Patient will be discharged on insulin pump and has outpt appt within 1 week with Dr Bales  -Emphasized importance of optho follow up after pregnancy for retinopathy screening

## 2020-09-29 LAB
GLUCOSE BLDC GLUCOMTR-MCNC: 119 MG/DL — HIGH (ref 70–99)
GLUCOSE BLDC GLUCOMTR-MCNC: 124 MG/DL — HIGH (ref 70–99)
GLUCOSE BLDC GLUCOMTR-MCNC: 160 MG/DL — HIGH (ref 70–99)
GLUCOSE BLDC GLUCOMTR-MCNC: 88 MG/DL — SIGNIFICANT CHANGE UP (ref 70–99)

## 2020-09-29 PROCEDURE — 99233 SBSQ HOSP IP/OBS HIGH 50: CPT

## 2020-09-29 RX ADMIN — Medication 600 MILLIGRAM(S): at 05:09

## 2020-09-29 RX ADMIN — Medication 600 MILLIGRAM(S): at 11:32

## 2020-09-29 RX ADMIN — HEPARIN SODIUM 5000 UNIT(S): 5000 INJECTION INTRAVENOUS; SUBCUTANEOUS at 17:34

## 2020-09-29 RX ADMIN — Medication 600 MILLIGRAM(S): at 05:39

## 2020-09-29 RX ADMIN — Medication 975 MILLIGRAM(S): at 14:20

## 2020-09-29 RX ADMIN — Medication 1 MILLIGRAM(S): at 11:32

## 2020-09-29 RX ADMIN — Medication 600 MILLIGRAM(S): at 17:34

## 2020-09-29 RX ADMIN — Medication 975 MILLIGRAM(S): at 09:00

## 2020-09-29 RX ADMIN — Medication 600 MILLIGRAM(S): at 18:27

## 2020-09-29 RX ADMIN — HEPARIN SODIUM 5000 UNIT(S): 5000 INJECTION INTRAVENOUS; SUBCUTANEOUS at 05:08

## 2020-09-29 RX ADMIN — Medication 600 MILLIGRAM(S): at 12:23

## 2020-09-29 RX ADMIN — Medication 975 MILLIGRAM(S): at 08:13

## 2020-09-29 RX ADMIN — Medication 975 MILLIGRAM(S): at 15:00

## 2020-09-29 NOTE — PROGRESS NOTE ADULT - SUBJECTIVE AND OBJECTIVE BOX
OB Postpartum Note:  Delivery, POD#2    S: 29yo POD#2 s/p LTCS for breech/PROM. The patient feels well.  Pain is well controlled. She is tolerating a regular diet and passing flatus. She is voiding spontaneously, and ambulating without difficulty. Denies CP/SOB. Denies lightheadedness/dizziness. Denies N/V.    O:  Vitals:  Vital Signs Last 24 Hrs  T(C): 36.6 (29 Sep 2020 05:52), Max: 36.6 (28 Sep 2020 14:00)  T(F): 97.9 (29 Sep 2020 05:52), Max: 97.9 (28 Sep 2020 14:00)  HR: 88 (29 Sep 2020 05:52) (88 - 96)  BP: 118/82 (29 Sep 2020 05:52) (97/82 - 118/82)  BP(mean): --  RR: 18 (29 Sep 2020 05:52) (17 - 18)  SpO2: 100% (29 Sep 2020 05:52) (100% - 100%)    MEDICATIONS  (STANDING):  acetaminophen   Tablet .. 975 milliGRAM(s) Oral <User Schedule>  dextrose 5%. 1000 milliLiter(s) (50 mL/Hr) IV Continuous <Continuous>  dextrose 50% Injectable 12.5 Gram(s) IV Push once  dextrose 50% Injectable 25 Gram(s) IV Push once  dextrose 50% Injectable 25 Gram(s) IV Push once  diphtheria/tetanus/pertussis (acellular) Vaccine (ADAcel) 0.5 milliLiter(s) IntraMuscular once  folic acid 1 milliGRAM(s) Oral daily  heparin   Injectable 5000 Unit(s) SubCutaneous every 12 hours  ibuprofen  Tablet. 600 milliGRAM(s) Oral every 6 hours  insulin aspart (NovoLOG) Pump 1 Each SubCutaneous Continuous Pump  prenatal multivitamin 1 Tablet(s) Oral daily    MEDICATIONS  (PRN):  dextrose 40% Gel 15 Gram(s) Oral once PRN Blood Glucose LESS THAN 70 milliGRAM(s)/deciliter  diphenhydrAMINE 25 milliGRAM(s) Oral every 6 hours PRN Itching  glucagon  Injectable 1 milliGRAM(s) IntraMuscular once PRN Glucose LESS THAN 70 milligrams/deciliter  lanolin Ointment 1 Application(s) Topical every 6 hours PRN Sore Nipples  magnesium hydroxide Suspension 30 milliLiter(s) Oral two times a day PRN Constipation  oxyCODONE    IR 5 milliGRAM(s) Oral every 3 hours PRN Moderate to Severe Pain (4-10)  oxyCODONE    IR 5 milliGRAM(s) Oral once PRN Moderate to Severe Pain (4-10)  simethicone 80 milliGRAM(s) Chew every 4 hours PRN Gas      LABS:  Blood type: B Positive  Rubella IgG: RPR: Negative                          9.9<L>   10.49 >-----------< 175    (  @ 05:40 )             31.3<L>                        11.1<L>   13.89<H> >-----------< 202    (  @ 16:25 )             33.7<L>                        11.8   7.82 >-----------< 200    (  @ 08:35 )             36.3                  Physical exam:  Gen: NAD  Abdomen: Soft, nontender, no distension , firm uterine fundus at umbilicus.  Incision: Clean, dry, and intact, steri strips in place  Pelvic: Normal lochia noted  Ext: No calf tenderness

## 2020-09-29 NOTE — PROGRESS NOTE ADULT - ATTENDING COMMENTS
29 y/o  s/p P'C/S for PROM and Breech presentation. Pregnancy complicated with Type II Diabetes on Insulin pump. Postpartum course complicated by PPH with QBL 1220. Hgb 11.1 - 9.9. Patient's vitals are stable. She reports passage of flatus, ambulating, tolerating a regular diet. Incision clean, dry, and intact  - Continue postpartum care  - Lactation Consult requested  - Continue to monitor fingersticks   - Continue Insulin NovoLOG continuous pump with corrective regimen sliding scale  - Discharge planning
Dwight Hinojosa MD  Division of Endocrinology  Pager: [phone]    If after 6PM or before 9AM, or on weekends/holidays, please call endocrine answering service for assistance ([phone]).  For nonurgent matters email [email or web address] for assistance.
Discharge to home tomorrow

## 2020-09-29 NOTE — PROGRESS NOTE ADULT - ASSESSMENT
30 yr old F with Type 2 DM (since age 15) on Omnipod insulin pump s/p C section 9/27. Transitioned to preprogrammed post partum settings. Plans on breast feeding. Complex patient high level decision making.    Problem/Recommendation - 1:  Problem: Type 2 diabetes mellitus without complication, with long-term current use of insulin.  -While inpatient CHO diet and FS AC and HS  -Goal Glucose 100-180  -Continue current settings on insulin pump, BG has been stable, no hypoglycemia events or symptoms.  Basal:  12a-5a 0.1   5a-12p 1.0  12p-12a 0.55  I:C 1:6  ISF 1:40  -Patient will be discharged on insulin pump and has outpt appt within 1 week with endocrinologist Dr Bales. Anticipate dc on current insulin pump settings if glucose remains stable up until time of discharge.  -optho follow up after pregnancy for retinopathy screening.  Problem/Recommendation - 2:  ·  Problem: Insulin pump status.    Carb intake and boluses must be documented in nursing flow sheet  Last site change was on 9/28 (q 3 days)  Has supplies.    Problem/Recommendation - 3:  ·  Problem: Hypertension, unspecified type.  Recommendation: BP goal <130/80  Currently controlled off medication.

## 2020-09-29 NOTE — PROGRESS NOTE ADULT - SUBJECTIVE AND OBJECTIVE BOX
Chief Complaint: DM2 postpartum on insulin pump    History: Tolerating po.  No hypoglycemia events or symptoms.  Wearing Omnipod (Novolog) insulin pump on R thigh.  Last site change yesterday.    MEDICATIONS  (STANDING):  acetaminophen   Tablet .. 975 milliGRAM(s) Oral <User Schedule>  dextrose 5%. 1000 milliLiter(s) (50 mL/Hr) IV Continuous <Continuous>  dextrose 50% Injectable 12.5 Gram(s) IV Push once  dextrose 50% Injectable 25 Gram(s) IV Push once  dextrose 50% Injectable 25 Gram(s) IV Push once  diphtheria/tetanus/pertussis (acellular) Vaccine (ADAcel) 0.5 milliLiter(s) IntraMuscular once  folic acid 1 milliGRAM(s) Oral daily  heparin   Injectable 5000 Unit(s) SubCutaneous every 12 hours  ibuprofen  Tablet. 600 milliGRAM(s) Oral every 6 hours  insulin aspart (NovoLOG) Pump 1 Each SubCutaneous Continuous Pump  prenatal multivitamin 1 Tablet(s) Oral daily    MEDICATIONS  (PRN):  dextrose 40% Gel 15 Gram(s) Oral once PRN Blood Glucose LESS THAN 70 milliGRAM(s)/deciliter  diphenhydrAMINE 25 milliGRAM(s) Oral every 6 hours PRN Itching  glucagon  Injectable 1 milliGRAM(s) IntraMuscular once PRN Glucose LESS THAN 70 milligrams/deciliter  lanolin Ointment 1 Application(s) Topical every 6 hours PRN Sore Nipples  magnesium hydroxide Suspension 30 milliLiter(s) Oral two times a day PRN Constipation  oxyCODONE    IR 5 milliGRAM(s) Oral every 3 hours PRN Moderate to Severe Pain (4-10)  oxyCODONE    IR 5 milliGRAM(s) Oral once PRN Moderate to Severe Pain (4-10)  simethicone 80 milliGRAM(s) Chew every 4 hours PRN Gas      Allergies    No Known Allergies    Intolerances      Review of Systems:    ALL OTHER SYSTEMS REVIEWED AND NEGATIVE      PHYSICAL EXAM:  VITALS: T(C): 36.4 (09-29-20 @ 14:08)  T(F): 97.5 (09-29-20 @ 14:08), Max: 97.9 (09-29-20 @ 05:52)  HR: 95 (09-29-20 @ 14:08) (88 - 96)  BP: 104/67 (09-29-20 @ 14:08) (97/82 - 118/82)  RR:  (17 - 18)  SpO2:  (98% - 100%)  Wt(kg): --  GENERAL: NAD, well-groomed, well-developed  EYES: No proptosis, no lid lag, anicteric  HEENT:  Atraumatic, Normocephalic, moist mucous membranes  RESPIRATORY: nonlabored respirations  SKIN: Insulin pump site R thigh intact  PSYCH: Alert and oriented x 3, normal affect, normal mood    CAPILLARY BLOOD GLUCOSE      POCT Blood Glucose.: 119 mg/dL (29 Sep 2020 11:28)  POCT Blood Glucose.: 88 mg/dL (29 Sep 2020 07:32)  POCT Blood Glucose.: 178 mg/dL (28 Sep 2020 22:06)  POCT Blood Glucose.: 139 mg/dL (28 Sep 2020 17:04)              A1C with Estimated Average Glucose: 4.8 % (09-28-20 @ 05:40)      Thyroid Function Tests:

## 2020-09-29 NOTE — PROGRESS NOTE ADULT - PROBLEM SELECTOR PLAN 1
- Continue motrin, tylenol, oxycodone PRN for pain control.  - Continue insulin pump   - Continue regular diet  Galina Hernandez PGY-3

## 2020-09-29 NOTE — PROGRESS NOTE ADULT - ASSESSMENT
A/P: 29yo POD#2 s/p LTCS for breech/PROM. Patient has a history of DM2 currently on Insulin pump.  Patient is stable and is doing well post-operatively.

## 2020-09-30 VITALS
OXYGEN SATURATION: 100 % | DIASTOLIC BLOOD PRESSURE: 72 MMHG | SYSTOLIC BLOOD PRESSURE: 114 MMHG | TEMPERATURE: 98 F | HEART RATE: 86 BPM | RESPIRATION RATE: 16 BRPM

## 2020-09-30 DIAGNOSIS — O35.8XX0 MATERNAL CARE FOR OTHER (SUSPECTED) FETAL ABNORMALITY AND DAMAGE, NOT APPLICABLE OR UNSPECIFIED: ICD-10-CM

## 2020-09-30 DIAGNOSIS — O35.2XX0 MATERNAL CARE FOR (SUSPECTED) HEREDITARY DISEASE IN FETUS, NOT APPLICABLE OR UNSPECIFIED: ICD-10-CM

## 2020-09-30 DIAGNOSIS — O24.113 PRE-EXISTING TYPE 2 DIABETES MELLITUS, IN PREGNANCY, THIRD TRIMESTER: ICD-10-CM

## 2020-09-30 LAB
GLUCOSE BLDC GLUCOMTR-MCNC: 122 MG/DL — HIGH (ref 70–99)
GLUCOSE BLDC GLUCOMTR-MCNC: 99 MG/DL — SIGNIFICANT CHANGE UP (ref 70–99)

## 2020-09-30 RX ORDER — INSULIN ASPART 100 [IU]/ML
0 INJECTION, SOLUTION SUBCUTANEOUS
Qty: 0 | Refills: 0 | DISCHARGE

## 2020-09-30 RX ORDER — LANOLIN
1 OINTMENT (GRAM) TOPICAL
Qty: 0 | Refills: 0 | DISCHARGE
Start: 2020-09-30

## 2020-09-30 RX ORDER — IBUPROFEN 200 MG
1 TABLET ORAL
Qty: 0 | Refills: 0 | DISCHARGE
Start: 2020-09-30

## 2020-09-30 RX ORDER — ACETAMINOPHEN 500 MG
3 TABLET ORAL
Qty: 0 | Refills: 0 | DISCHARGE
Start: 2020-09-30

## 2020-09-30 RX ORDER — CHOLECALCIFEROL (VITAMIN D3) 125 MCG
0 CAPSULE ORAL
Qty: 0 | Refills: 0 | DISCHARGE

## 2020-09-30 RX ORDER — FERROUS SULFATE 325(65) MG
0 TABLET ORAL
Qty: 0 | Refills: 0 | DISCHARGE

## 2020-09-30 RX ORDER — ASPIRIN/CALCIUM CARB/MAGNESIUM 324 MG
0 TABLET ORAL
Qty: 0 | Refills: 0 | DISCHARGE

## 2020-09-30 RX ORDER — METFORMIN HYDROCHLORIDE 850 MG/1
1 TABLET ORAL
Qty: 0 | Refills: 0 | DISCHARGE

## 2020-09-30 RX ADMIN — Medication 600 MILLIGRAM(S): at 09:56

## 2020-09-30 RX ADMIN — Medication 600 MILLIGRAM(S): at 01:15

## 2020-09-30 RX ADMIN — Medication 600 MILLIGRAM(S): at 01:45

## 2020-09-30 RX ADMIN — Medication 600 MILLIGRAM(S): at 09:23

## 2020-09-30 RX ADMIN — Medication 975 MILLIGRAM(S): at 04:31

## 2020-09-30 RX ADMIN — HEPARIN SODIUM 5000 UNIT(S): 5000 INJECTION INTRAVENOUS; SUBCUTANEOUS at 04:30

## 2020-09-30 RX ADMIN — Medication 1 TABLET(S): at 13:56

## 2020-09-30 NOTE — PROGRESS NOTE ADULT - SUBJECTIVE AND OBJECTIVE BOX
Post-Operative Note, C/S  She is a  30y woman who is now post-operative day: 3    Subjective:  The patient feels well.  She is ambulating.   She is tolerating regular diet.  She denies nausea and vomiting; denies fever.  She is voiding.  Her pain is controlled; incisional pain is appropriate.  She reports normal postpartum bleeding.  She is breastfeeding and formula feeding.    Physical exam:    Vital Signs Last 24 Hrs  T(C): 36.4 (30 Sep 2020 05:56), Max: 36.4 (29 Sep 2020 14:08)  T(F): 97.5 (30 Sep 2020 05:56), Max: 97.5 (29 Sep 2020 14:08)  HR: 89 (30 Sep 2020 05:56) (89 - 96)  BP: 113/70 (30 Sep 2020 05:56) (104/67 - 119/76)  BP(mean): --  RR: 17 (30 Sep 2020 05:56) (17 - 18)  SpO2: 99% (30 Sep 2020 05:56) (98% - 100%)    Gen: NAD  Breast: Soft, nontender, not engorged.  Abdomen: Soft, nontender, no distension , firm uterine fundus at umbilicus.  Incision: C/D/I.  Pelvic: Normal lochia noted  Ext: No calf tenderness    Allergies    No Known Allergies      MEDICATIONS  (STANDING):  acetaminophen   Tablet .. 975 milliGRAM(s) Oral <User Schedule>  dextrose 5%. 1000 milliLiter(s) (50 mL/Hr) IV Continuous <Continuous>  dextrose 50% Injectable 12.5 Gram(s) IV Push once  dextrose 50% Injectable 25 Gram(s) IV Push once  dextrose 50% Injectable 25 Gram(s) IV Push once  diphtheria/tetanus/pertussis (acellular) Vaccine (ADAcel) 0.5 milliLiter(s) IntraMuscular once  folic acid 1 milliGRAM(s) Oral daily  heparin   Injectable 5000 Unit(s) SubCutaneous every 12 hours  ibuprofen  Tablet. 600 milliGRAM(s) Oral every 6 hours  insulin aspart (NovoLOG) Pump 1 Each SubCutaneous Continuous Pump  prenatal multivitamin 1 Tablet(s) Oral daily    MEDICATIONS  (PRN):  dextrose 40% Gel 15 Gram(s) Oral once PRN Blood Glucose LESS THAN 70 milliGRAM(s)/deciliter  diphenhydrAMINE 25 milliGRAM(s) Oral every 6 hours PRN Itching  glucagon  Injectable 1 milliGRAM(s) IntraMuscular once PRN Glucose LESS THAN 70 milligrams/deciliter  lanolin Ointment 1 Application(s) Topical every 6 hours PRN Sore Nipples  magnesium hydroxide Suspension 30 milliLiter(s) Oral two times a day PRN Constipation  oxyCODONE    IR 5 milliGRAM(s) Oral every 3 hours PRN Moderate to Severe Pain (4-10)  oxyCODONE    IR 5 milliGRAM(s) Oral once PRN Moderate to Severe Pain (4-10)  simethicone 80 milliGRAM(s) Chew every 4 hours PRN Gas

## 2020-09-30 NOTE — PROGRESS NOTE ADULT - SUBJECTIVE AND OBJECTIVE BOX
OB Postpartum Note:  Delivery, POD#3    S: 29yo  POD#3 s/p LTCS. Patient has DM2 on insulin pump. The patient feels well.  Pain is well controlled. She is tolerating a regular diet and passing flatus. She is voiding spontaneously, and ambulating without difficulty. Denies CP/SOB. Denies lightheadedness/dizziness. Denies N/V.    O:  Vitals:  Vital Signs Last 24 Hrs  T(C): 36.4 (30 Sep 2020 05:56), Max: 36.4 (29 Sep 2020 14:08)  T(F): 97.5 (30 Sep 2020 05:56), Max: 97.5 (29 Sep 2020 14:08)  HR: 89 (30 Sep 2020 05:56) (89 - 96)  BP: 113/70 (30 Sep 2020 05:56) (104/67 - 119/76)  BP(mean): --  RR: 17 (30 Sep 2020 05:56) (17 - 18)  SpO2: 99% (30 Sep 2020 05:56) (98% - 100%)    MEDICATIONS  (STANDING):  acetaminophen   Tablet .. 975 milliGRAM(s) Oral <User Schedule>  dextrose 5%. 1000 milliLiter(s) (50 mL/Hr) IV Continuous <Continuous>  dextrose 50% Injectable 12.5 Gram(s) IV Push once  dextrose 50% Injectable 25 Gram(s) IV Push once  dextrose 50% Injectable 25 Gram(s) IV Push once  diphtheria/tetanus/pertussis (acellular) Vaccine (ADAcel) 0.5 milliLiter(s) IntraMuscular once  folic acid 1 milliGRAM(s) Oral daily  heparin   Injectable 5000 Unit(s) SubCutaneous every 12 hours  ibuprofen  Tablet. 600 milliGRAM(s) Oral every 6 hours  insulin aspart (NovoLOG) Pump 1 Each SubCutaneous Continuous Pump  prenatal multivitamin 1 Tablet(s) Oral daily    MEDICATIONS  (PRN):  dextrose 40% Gel 15 Gram(s) Oral once PRN Blood Glucose LESS THAN 70 milliGRAM(s)/deciliter  diphenhydrAMINE 25 milliGRAM(s) Oral every 6 hours PRN Itching  glucagon  Injectable 1 milliGRAM(s) IntraMuscular once PRN Glucose LESS THAN 70 milligrams/deciliter  lanolin Ointment 1 Application(s) Topical every 6 hours PRN Sore Nipples  magnesium hydroxide Suspension 30 milliLiter(s) Oral two times a day PRN Constipation  oxyCODONE    IR 5 milliGRAM(s) Oral every 3 hours PRN Moderate to Severe Pain (4-10)  oxyCODONE    IR 5 milliGRAM(s) Oral once PRN Moderate to Severe Pain (4-10)  simethicone 80 milliGRAM(s) Chew every 4 hours PRN Gas      LABS:  Blood type: B Positive  Rubella IgG: RPR: Negative                          9.9<L>   10.49 >-----------< 175    (  @ 05:40 )             31.3<L>                        11.1<L>   13.89<H> >-----------< 202    (  @ 16:25 )             33.7<L>                  Physical exam:  Gen: NAD  Abdomen: Soft, nontender, no distension , firm uterine fundus at umbilicus.  Incision: Clean, dry, and intact. Steri strips in place  Pelvic: Normal lochia noted  Ext: No calf tenderness

## 2020-09-30 NOTE — PROGRESS NOTE ADULT - ASSESSMENT
Assessment and Plan  POD #3 s/p C/S.  Doing well and bonding with baby  Encourage ambulation.  Incisional care and PO instructions reviewed.  CPC.  Class C DM  ·	continue insulin pump  ·	schedule f/u apt. with endocrinologist outpt.  ·	continue to check glucose levels   ·	S&S of hypoglycemia and hyperglycemia reviewed  discharge pt. home today POD#3  RTO 2 weeks for post op visit/PRN

## 2020-09-30 NOTE — PROGRESS NOTE ADULT - PROBLEM SELECTOR PLAN 1
- Continue to follow endocrinology recommendations: continue insulin pump settings.   - Continue motrin, tylenol, oxycodone PRN for pain control.  - Increase ambulation  - Continue regular diet  - Discharge planning  Galina Hernandez PGY-3

## 2020-10-01 ENCOUNTER — RECORD ABSTRACTING (OUTPATIENT)
Age: 30
End: 2020-10-01

## 2020-10-01 ENCOUNTER — APPOINTMENT (OUTPATIENT)
Dept: ANTEPARTUM | Facility: HOSPITAL | Age: 30
End: 2020-10-01

## 2020-10-01 RX ORDER — ACETAMINOPHEN 325 MG/1
325 TABLET ORAL EVERY 6 HOURS
Refills: 0 | Status: ACTIVE | COMMUNITY
Start: 2020-10-01

## 2020-10-01 RX ORDER — METFORMIN ER 500 MG 500 MG/1
500 TABLET ORAL
Qty: 1 | Refills: 2 | Status: DISCONTINUED | COMMUNITY
Start: 2020-03-16 | End: 2020-10-01

## 2020-10-01 RX ORDER — LANOLIN
OINTMENT (GRAM) TOPICAL
Refills: 0 | Status: ACTIVE | COMMUNITY
Start: 2020-10-01

## 2020-10-01 RX ORDER — IBUPROFEN 600 MG/1
600 TABLET ORAL EVERY 8 HOURS
Refills: 0 | Status: ACTIVE | COMMUNITY
Start: 2020-10-01

## 2020-10-01 RX ORDER — METFORMIN HYDROCHLORIDE 1000 MG/1
1000 TABLET, EXTENDED RELEASE ORAL TWICE DAILY
Refills: 0 | Status: ACTIVE | COMMUNITY
Start: 2020-10-01

## 2020-10-02 DIAGNOSIS — O09.813 SUPERVISION OF PREGNANCY RESULTING FROM ASSISTED REPRODUCTIVE TECHNOLOGY, THIRD TRIMESTER: ICD-10-CM

## 2020-10-02 DIAGNOSIS — O24.113 PRE-EXISTING TYPE 2 DIABETES MELLITUS, IN PREGNANCY, THIRD TRIMESTER: ICD-10-CM

## 2020-10-02 DIAGNOSIS — O35.2XX0 MATERNAL CARE FOR (SUSPECTED) HEREDITARY DISEASE IN FETUS, NOT APPLICABLE OR UNSPECIFIED: ICD-10-CM

## 2020-10-02 LAB
SARS-COV-2 IGG SERPL QL IA: NEGATIVE — SIGNIFICANT CHANGE UP
SARS-COV-2 IGM SERPL IA-ACNC: <0.1 INDEX — SIGNIFICANT CHANGE UP

## 2020-10-05 ENCOUNTER — APPOINTMENT (OUTPATIENT)
Dept: ANTEPARTUM | Facility: CLINIC | Age: 30
End: 2020-10-05

## 2020-10-05 ENCOUNTER — APPOINTMENT (OUTPATIENT)
Dept: ANTEPARTUM | Facility: HOSPITAL | Age: 30
End: 2020-10-05

## 2020-10-05 DIAGNOSIS — O24.113 PRE-EXISTING TYPE 2 DIABETES MELLITUS, IN PREGNANCY, THIRD TRIMESTER: ICD-10-CM

## 2020-10-05 DIAGNOSIS — O35.8XX0 MATERNAL CARE FOR OTHER (SUSPECTED) FETAL ABNORMALITY AND DAMAGE, NOT APPLICABLE OR UNSPECIFIED: ICD-10-CM

## 2020-10-05 DIAGNOSIS — O35.2XX0 MATERNAL CARE FOR (SUSPECTED) HEREDITARY DISEASE IN FETUS, NOT APPLICABLE OR UNSPECIFIED: ICD-10-CM

## 2020-10-14 DIAGNOSIS — O35.8XX0 MATERNAL CARE FOR OTHER (SUSPECTED) FETAL ABNORMALITY AND DAMAGE, NOT APPLICABLE OR UNSPECIFIED: ICD-10-CM

## 2020-10-14 DIAGNOSIS — O35.2XX0 MATERNAL CARE FOR (SUSPECTED) HEREDITARY DISEASE IN FETUS, NOT APPLICABLE OR UNSPECIFIED: ICD-10-CM

## 2020-10-14 DIAGNOSIS — O24.113 PRE-EXISTING TYPE 2 DIABETES MELLITUS, IN PREGNANCY, THIRD TRIMESTER: ICD-10-CM

## 2020-10-15 DIAGNOSIS — O24.113 PRE-EXISTING TYPE 2 DIABETES MELLITUS, IN PREGNANCY, THIRD TRIMESTER: ICD-10-CM

## 2020-10-15 DIAGNOSIS — O35.8XX0 MATERNAL CARE FOR OTHER (SUSPECTED) FETAL ABNORMALITY AND DAMAGE, NOT APPLICABLE OR UNSPECIFIED: ICD-10-CM

## 2020-10-15 DIAGNOSIS — O35.2XX0 MATERNAL CARE FOR (SUSPECTED) HEREDITARY DISEASE IN FETUS, NOT APPLICABLE OR UNSPECIFIED: ICD-10-CM

## 2020-10-19 ENCOUNTER — NON-APPOINTMENT (OUTPATIENT)
Age: 30
End: 2020-10-19

## 2020-10-23 DIAGNOSIS — O32.9XX0 MATERNAL CARE FOR MALPRESENTATION OF FETUS, UNSPECIFIED, NOT APPLICABLE OR UNSPECIFIED: ICD-10-CM

## 2020-10-23 DIAGNOSIS — O24.113 PRE-EXISTING TYPE 2 DIABETES MELLITUS, IN PREGNANCY, THIRD TRIMESTER: ICD-10-CM

## 2020-10-23 DIAGNOSIS — O35.2XX0 MATERNAL CARE FOR (SUSPECTED) HEREDITARY DISEASE IN FETUS, NOT APPLICABLE OR UNSPECIFIED: ICD-10-CM

## 2021-06-20 ENCOUNTER — TRANSCRIPTION ENCOUNTER (OUTPATIENT)
Age: 31
End: 2021-06-20

## 2021-09-26 ENCOUNTER — TRANSCRIPTION ENCOUNTER (OUTPATIENT)
Age: 31
End: 2021-09-26

## 2021-10-26 NOTE — OB PROVIDER H&P - BLOOD TRANSFUSION, PREVIOUS, PROFILE
Patient called, requested refill.        Next Office Visit Date:  Future Appointments   Date Time Provider Justine Quiles   11/16/2021 10:30 AM Sera Vo MD [address] please review via PDMP no

## 2022-03-04 ENCOUNTER — TRANSCRIPTION ENCOUNTER (OUTPATIENT)
Age: 32
End: 2022-03-04

## 2022-07-11 NOTE — OB RN PATIENT PROFILE - WEIGHT: TOTAL WEIGHT IN LBS
Sski Pregnancy And Lactation Text: This medication is Pregnancy Category D and isn't considered safe during pregnancy. It is excreted in breast milk. 0

## 2022-08-10 ENCOUNTER — NON-APPOINTMENT (OUTPATIENT)
Age: 32
End: 2022-08-10

## 2023-05-23 NOTE — OB PROVIDER DELIVERY SUMMARY - NSURGENCYA_OBGYN_ALL_OB
Non Scheduled Non Urgent Hydroxychloroquine Pregnancy And Lactation Text: This medication has been shown to cause fetal harm but it isn't assigned a Pregnancy Risk Category. There are small amounts excreted in breast milk.

## 2024-02-05 NOTE — OB RN PATIENT PROFILE - NS_CONSENTSAPP_OBGYN_ALL_OB
Patient identification verified with 2 identifiers.    Location: [facility] - [address] [id number] [facility]. [place], [place] [address] [phone]     Referring Physician: DR. ESCALANTE  Enrollment/ Re-enrollment date: Enrollment/Re-enrollment date: 2024  INR Goal: 2.0-3.0  INR monitoring is per AMS protocol.  Anticoagulation Medication: warfarin 10 MG  Indication: atrial fibrillation    Subjective   Bleeding signs/symptoms: No    Bruising: No   Major bleeding event: No  Thrombosis signs/symptoms: No  Thromboembolic event: No  Missed doses: No  Extra doses: No  Medication changes: No  Dietary changes: No  Change in health: No  Change in activity: No  Alcohol: No  Other concerns: No    Upcoming Surgeries:  Does the Patient Have any upcoming surgeries that require interruption in anticoagulation therapy? no  Does the patient require bridging? no      Anticoagulation Summary  As of 2024      INR goal:  2.0-3.0   TTR:  59.2 % (4.3 mo)   INR used for dosin.70 (2024)   Weekly warfarin total:  75 mg               Assessment/Plan   Subtherapeutic     1. New dose: INCREASE PER PROTOCOL  2. Next INR: 1 week      Education provided to patient during the visit:  Patient instructed to call in interim with questions, concerns and changes.   Patient educated on interactions between medications and warfarin.   Patient educated on dietary consistency in vitamin k consumption.   Patient educated on signs of bleeding/clotting.   Patient educated on compliance with dosing, follow up appointments, and prescribed plan of care.     No

## 2024-07-01 ENCOUNTER — APPOINTMENT (OUTPATIENT)
Dept: OBGYN | Facility: CLINIC | Age: 34
End: 2024-07-01
Payer: COMMERCIAL

## 2024-07-01 VITALS
HEIGHT: 65 IN | BODY MASS INDEX: 28.32 KG/M2 | DIASTOLIC BLOOD PRESSURE: 74 MMHG | SYSTOLIC BLOOD PRESSURE: 110 MMHG | WEIGHT: 170 LBS

## 2024-07-01 DIAGNOSIS — D25.0 SUBMUCOUS LEIOMYOMA OF UTERUS: ICD-10-CM

## 2024-07-01 PROCEDURE — 99203 OFFICE O/P NEW LOW 30 MIN: CPT

## 2024-09-11 ENCOUNTER — APPOINTMENT (OUTPATIENT)
Dept: OBGYN | Facility: CLINIC | Age: 34
End: 2024-09-11
Payer: COMMERCIAL

## 2024-09-11 VITALS
HEIGHT: 65 IN | DIASTOLIC BLOOD PRESSURE: 84 MMHG | BODY MASS INDEX: 28.32 KG/M2 | HEART RATE: 99 BPM | SYSTOLIC BLOOD PRESSURE: 120 MMHG | WEIGHT: 170 LBS

## 2024-09-11 DIAGNOSIS — R92.30 DENSE BREASTS, UNSPECIFIED: ICD-10-CM

## 2024-09-11 DIAGNOSIS — N80.9 ENDOMETRIOSIS, UNSPECIFIED: ICD-10-CM

## 2024-09-11 DIAGNOSIS — Z01.411 ENCOUNTER FOR GYNECOLOGICAL EXAMINATION (GENERAL) (ROUTINE) WITH ABNORMAL FINDINGS: ICD-10-CM

## 2024-09-11 DIAGNOSIS — D25.0 SUBMUCOUS LEIOMYOMA OF UTERUS: ICD-10-CM

## 2024-09-11 PROCEDURE — 99395 PREV VISIT EST AGE 18-39: CPT

## 2024-09-11 NOTE — HISTORY OF PRESENT ILLNESS
[FreeTextEntry1] : 34 year old presents for an annual. She is doing well and has no complaints. Pt was seen 24 for an intracavitary fibroid and is scheduled for a hysteroscopic myomectomy on 10/11/24. pt reports having endometriosis with tethering posterior uterus.  OBH: (2020)  due to breech GYNH: (2019) endocervical polypectomy/LEEP; h/o endometriosis, fibroids, ovarian cyst  PMH: Type 2 diabetes, anemia, ?PID  PSH: (2019) endocervical polypectomy/LEEP  FH: Diabetes - mother, father, sibling; HLD - mother  Medications: Novolog 100 IU, Metformin 1000 x2 daily, Alyacen, PNV, Vitamin D Allergies: NKDA 
Resident

## 2024-09-11 NOTE — PHYSICAL EXAM

## 2024-09-11 NOTE — END OF VISIT
[FreeTextEntry3] :  I, Alanna Castaneda, acted as a scribe on behalf of Dr. Dario Bingham M.D. on 09/11/2024.   All medical entries made by the scribe were at my Dr. Dario Bingham M.D direction and personally dictated by me on 09/11/2024. I have reviewed the chart and agree that the record accurately reflects my personal performance of the history, physical exam, assessment and plan. I have also personally directed, reviewed, and agreed with the chart.

## 2024-09-11 NOTE — PLAN
[FreeTextEntry1] : 35 yo for an annual. stable  Health care maintenance -pap -vit D/exercise -breast mammo/sono -colon screening reviewed -f/u PCP for annual and appropriate immunizations -rto 1 year  Endometriosis/adenomyosis -hormonal suppression after next delivery -yearly sono or MRI pelvis -will start with mirena IUD pp then if no relief consider hysterectomy as pt only wants 1 more child  Infertility -pt to undergo IVF postoperatively

## 2024-09-12 LAB — HPV HIGH+LOW RISK DNA PNL CVX: NOT DETECTED

## 2024-09-15 LAB — CYTOLOGY CVX/VAG DOC THIN PREP: NORMAL

## 2024-09-23 ENCOUNTER — TRANSCRIPTION ENCOUNTER (OUTPATIENT)
Age: 34
End: 2024-09-23

## 2024-09-26 NOTE — OB RN TRIAGE NOTE - NS_TRIAGETIMEOF NOTIFICATION_OBGYN_ALL_OB_DT
Prior to immunization administration, verified patients identity using patient s name and date of birth. Please see Immunization Activity for additional information.     Screening Questionnaire for Adult Immunization    Are you sick today?   No   Do you have allergies to medications, food, a vaccine component or latex?   No   Have you ever had a serious reaction after receiving a vaccination?   No   Do you have a long-term health problem with heart, lung, kidney, or metabolic disease (e.g., diabetes), asthma, a blood disorder, no spleen, complement component deficiency, a cochlear implant, or a spinal fluid leak?  Are you on long-term aspirin therapy?   No   Do you have cancer, leukemia, HIV/AIDS, or any other immune system problem?   No   Do you have a parent, brother, or sister with an immune system problem?   No   In the past 3 months, have you taken medications that affect  your immune system, such as prednisone, other steroids, or anticancer drugs; drugs for the treatment of rheumatoid arthritis, Crohn s disease, or psoriasis; or have you had radiation treatments?   No   Have you had a seizure, or a brain or other nervous system problem?   No   During the past year, have you received a transfusion of blood or blood    products, or been given immune (gamma) globulin or antiviral drug?   No   For women: Are you pregnant or is there a chance you could become       pregnant during the next month?   No   Have you received any vaccinations in the past 4 weeks?   No     Immunization questionnaire answers were all negative.      Patient instructed to remain in clinic for 15 minutes afterwards, and to report any adverse reactions.     Screening performed by Karlie Villafuerte CMA on 9/26/2024 at 8:04 AM.   
25-May-2020 13:55
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
